# Patient Record
Sex: FEMALE | Race: BLACK OR AFRICAN AMERICAN | Employment: UNEMPLOYED | URBAN - METROPOLITAN AREA
[De-identification: names, ages, dates, MRNs, and addresses within clinical notes are randomized per-mention and may not be internally consistent; named-entity substitution may affect disease eponyms.]

---
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
## 2022-01-05 ENCOUNTER — HOSPITAL ENCOUNTER (INPATIENT)
Age: 22
LOS: 4 days | Discharge: HOME | DRG: 540 | End: 2022-01-09
Attending: OBSTETRICS & GYNECOLOGY | Admitting: OBSTETRICS & GYNECOLOGY
Payer: COMMERCIAL

## 2022-01-05 DIAGNOSIS — G89.18 POST-OP PAIN: Primary | ICD-10-CM

## 2022-01-05 PROBLEM — Z34.90 ENCOUNTER FOR INDUCTION OF LABOR: Status: ACTIVE | Noted: 2022-01-05

## 2022-01-05 LAB
ABO/RH: NORMAL
AMPHETAMINES: NEGATIVE
ANTIBODY SCREEN: NEGATIVE
BARBITURATE SCREEN URINE: NEGATIVE
BENZODIAZEPINE SCREEN, URINE: NEGATIVE
CANNABINOID SCREEN URINE: NEGATIVE
COCAINE METABOLITE: NEGATIVE
HCT VFR BLD CALC: 36.8 % (ref 37–47)
HEMOGLOBIN: 11.9 GM/DL (ref 12.5–16)
MCH RBC QN AUTO: 27.9 PG (ref 27–31)
MCHC RBC AUTO-ENTMCNC: 32.3 % (ref 32–36)
MCV RBC AUTO: 86.2 FL (ref 78–100)
OPIATES, URINE: NEGATIVE
OXYCODONE: NEGATIVE
PDW BLD-RTO: 12.2 % (ref 11.7–14.9)
PHENCYCLIDINE, URINE: NEGATIVE
PLATELET # BLD: 200 K/CU MM (ref 140–440)
PMV BLD AUTO: 10.5 FL (ref 7.5–11.1)
RBC # BLD: 4.27 M/CU MM (ref 4.2–5.4)
SARS-COV-2, NAAT: NOT DETECTED
WBC # BLD: 12.4 K/CU MM (ref 4–10.5)

## 2022-01-05 PROCEDURE — 80307 DRUG TEST PRSMV CHEM ANLYZR: CPT

## 2022-01-05 PROCEDURE — 2580000003 HC RX 258: Performed by: OBSTETRICS & GYNECOLOGY

## 2022-01-05 PROCEDURE — 86850 RBC ANTIBODY SCREEN: CPT

## 2022-01-05 PROCEDURE — 6360000002 HC RX W HCPCS: Performed by: OBSTETRICS & GYNECOLOGY

## 2022-01-05 PROCEDURE — 86901 BLOOD TYPING SEROLOGIC RH(D): CPT

## 2022-01-05 PROCEDURE — 6370000000 HC RX 637 (ALT 250 FOR IP): Performed by: OBSTETRICS & GYNECOLOGY

## 2022-01-05 PROCEDURE — 85027 COMPLETE CBC AUTOMATED: CPT

## 2022-01-05 PROCEDURE — 86900 BLOOD TYPING SEROLOGIC ABO: CPT

## 2022-01-05 PROCEDURE — 1220000000 HC SEMI PRIVATE OB R&B

## 2022-01-05 PROCEDURE — 87635 SARS-COV-2 COVID-19 AMP PRB: CPT

## 2022-01-05 RX ORDER — LIDOCAINE HYDROCHLORIDE 10 MG/ML
30 INJECTION, SOLUTION EPIDURAL; INFILTRATION; INTRACAUDAL; PERINEURAL PRN
Status: DISCONTINUED | OUTPATIENT
Start: 2022-01-05 | End: 2022-01-06 | Stop reason: ALTCHOICE

## 2022-01-05 RX ORDER — FENTANYL CITRATE 50 UG/ML
100 INJECTION, SOLUTION INTRAMUSCULAR; INTRAVENOUS
Status: DISCONTINUED | OUTPATIENT
Start: 2022-01-05 | End: 2022-01-06 | Stop reason: HOSPADM

## 2022-01-05 RX ORDER — ONDANSETRON 2 MG/ML
4 INJECTION INTRAMUSCULAR; INTRAVENOUS EVERY 6 HOURS PRN
Status: DISCONTINUED | OUTPATIENT
Start: 2022-01-05 | End: 2022-01-06 | Stop reason: HOSPADM

## 2022-01-05 RX ORDER — SODIUM CHLORIDE 0.9 % (FLUSH) 0.9 %
5-40 SYRINGE (ML) INJECTION PRN
Status: DISCONTINUED | OUTPATIENT
Start: 2022-01-05 | End: 2022-01-06 | Stop reason: ALTCHOICE

## 2022-01-05 RX ORDER — SODIUM CHLORIDE, SODIUM LACTATE, POTASSIUM CHLORIDE, AND CALCIUM CHLORIDE .6; .31; .03; .02 G/100ML; G/100ML; G/100ML; G/100ML
1000 INJECTION, SOLUTION INTRAVENOUS PRN
Status: DISCONTINUED | OUTPATIENT
Start: 2022-01-05 | End: 2022-01-06 | Stop reason: ALTCHOICE

## 2022-01-05 RX ORDER — SODIUM CHLORIDE 0.9 % (FLUSH) 0.9 %
5-40 SYRINGE (ML) INJECTION EVERY 12 HOURS SCHEDULED
Status: DISCONTINUED | OUTPATIENT
Start: 2022-01-05 | End: 2022-01-06 | Stop reason: ALTCHOICE

## 2022-01-05 RX ORDER — SODIUM CHLORIDE 9 MG/ML
25 INJECTION, SOLUTION INTRAVENOUS PRN
Status: DISCONTINUED | OUTPATIENT
Start: 2022-01-05 | End: 2022-01-09 | Stop reason: HOSPADM

## 2022-01-05 RX ORDER — SODIUM CHLORIDE, SODIUM LACTATE, POTASSIUM CHLORIDE, CALCIUM CHLORIDE 600; 310; 30; 20 MG/100ML; MG/100ML; MG/100ML; MG/100ML
INJECTION, SOLUTION INTRAVENOUS CONTINUOUS
Status: DISCONTINUED | OUTPATIENT
Start: 2022-01-05 | End: 2022-01-06 | Stop reason: ALTCHOICE

## 2022-01-05 RX ORDER — ACYCLOVIR 800 MG/1
400 TABLET ORAL 3 TIMES DAILY
Status: DISCONTINUED | OUTPATIENT
Start: 2022-01-05 | End: 2022-01-06

## 2022-01-05 RX ORDER — SODIUM CHLORIDE, SODIUM LACTATE, POTASSIUM CHLORIDE, AND CALCIUM CHLORIDE .6; .31; .03; .02 G/100ML; G/100ML; G/100ML; G/100ML
500 INJECTION, SOLUTION INTRAVENOUS PRN
Status: DISCONTINUED | OUTPATIENT
Start: 2022-01-05 | End: 2022-01-06 | Stop reason: ALTCHOICE

## 2022-01-05 RX ADMIN — Medication 25 MCG: at 21:43

## 2022-01-05 RX ADMIN — AMPICILLIN SODIUM 2000 MG: 2 INJECTION, POWDER, FOR SOLUTION INTRAMUSCULAR; INTRAVENOUS at 21:45

## 2022-01-05 RX ADMIN — SODIUM CHLORIDE, POTASSIUM CHLORIDE, SODIUM LACTATE AND CALCIUM CHLORIDE: 600; 310; 30; 20 INJECTION, SOLUTION INTRAVENOUS at 21:05

## 2022-01-05 ASSESSMENT — PAIN SCALES - GENERAL: PAINLEVEL_OUTOF10: 0

## 2022-01-06 ENCOUNTER — ANESTHESIA (OUTPATIENT)
Dept: LABOR AND DELIVERY | Age: 22
DRG: 540 | End: 2022-01-06
Payer: COMMERCIAL

## 2022-01-06 ENCOUNTER — ANESTHESIA EVENT (OUTPATIENT)
Dept: LABOR AND DELIVERY | Age: 22
DRG: 540 | End: 2022-01-06
Payer: COMMERCIAL

## 2022-01-06 VITALS — DIASTOLIC BLOOD PRESSURE: 71 MMHG | OXYGEN SATURATION: 100 % | SYSTOLIC BLOOD PRESSURE: 109 MMHG

## 2022-01-06 LAB
HCT VFR BLD CALC: 31.3 % (ref 37–47)
HEMOGLOBIN: 10.1 GM/DL (ref 12.5–16)
HEPATITIS B SURFACE ANTIGEN: NON REACTIVE
MCH RBC QN AUTO: 27.9 PG (ref 27–31)
MCHC RBC AUTO-ENTMCNC: 32.3 % (ref 32–36)
MCV RBC AUTO: 86.5 FL (ref 78–100)
PDW BLD-RTO: 12.3 % (ref 11.7–14.9)
PLATELET # BLD: 164 K/CU MM (ref 140–440)
PMV BLD AUTO: 9.9 FL (ref 7.5–11.1)
RBC # BLD: 3.62 M/CU MM (ref 4.2–5.4)
WBC # BLD: 13.6 K/CU MM (ref 4–10.5)

## 2022-01-06 PROCEDURE — 7100000000 HC PACU RECOVERY - FIRST 15 MIN: Performed by: OBSTETRICS & GYNECOLOGY

## 2022-01-06 PROCEDURE — 6360000002 HC RX W HCPCS: Performed by: OBSTETRICS & GYNECOLOGY

## 2022-01-06 PROCEDURE — 3E0DXGC INTRODUCTION OF OTHER THERAPEUTIC SUBSTANCE INTO MOUTH AND PHARYNX, EXTERNAL APPROACH: ICD-10-PCS | Performed by: OBSTETRICS & GYNECOLOGY

## 2022-01-06 PROCEDURE — 6360000002 HC RX W HCPCS: Performed by: NURSE ANESTHETIST, CERTIFIED REGISTERED

## 2022-01-06 PROCEDURE — 2500000003 HC RX 250 WO HCPCS: Performed by: NURSE ANESTHETIST, CERTIFIED REGISTERED

## 2022-01-06 PROCEDURE — 10907ZC DRAINAGE OF AMNIOTIC FLUID, THERAPEUTIC FROM PRODUCTS OF CONCEPTION, VIA NATURAL OR ARTIFICIAL OPENING: ICD-10-PCS | Performed by: OBSTETRICS & GYNECOLOGY

## 2022-01-06 PROCEDURE — 87340 HEPATITIS B SURFACE AG IA: CPT

## 2022-01-06 PROCEDURE — 2580000003 HC RX 258: Performed by: OBSTETRICS & GYNECOLOGY

## 2022-01-06 PROCEDURE — 51702 INSERT TEMP BLADDER CATH: CPT

## 2022-01-06 PROCEDURE — 7100000001 HC PACU RECOVERY - ADDTL 15 MIN: Performed by: OBSTETRICS & GYNECOLOGY

## 2022-01-06 PROCEDURE — 2580000003 HC RX 258: Performed by: NURSE ANESTHETIST, CERTIFIED REGISTERED

## 2022-01-06 PROCEDURE — 2500000003 HC RX 250 WO HCPCS: Performed by: OBSTETRICS & GYNECOLOGY

## 2022-01-06 PROCEDURE — 86762 RUBELLA ANTIBODY: CPT

## 2022-01-06 PROCEDURE — 6370000000 HC RX 637 (ALT 250 FOR IP): Performed by: OBSTETRICS & GYNECOLOGY

## 2022-01-06 PROCEDURE — 3609079900 HC CESAREAN SECTION: Performed by: OBSTETRICS & GYNECOLOGY

## 2022-01-06 PROCEDURE — 1220000000 HC SEMI PRIVATE OB R&B

## 2022-01-06 PROCEDURE — 87536 HIV-1 QUANT&REVRSE TRNSCRPJ: CPT

## 2022-01-06 PROCEDURE — 86900 BLOOD TYPING SEROLOGIC ABO: CPT

## 2022-01-06 PROCEDURE — 85027 COMPLETE CBC AUTOMATED: CPT

## 2022-01-06 PROCEDURE — 3700000025 EPIDURAL BLOCK: Performed by: NURSE ANESTHETIST, CERTIFIED REGISTERED

## 2022-01-06 RX ORDER — ONDANSETRON 2 MG/ML
4 INJECTION INTRAMUSCULAR; INTRAVENOUS EVERY 6 HOURS PRN
Status: DISCONTINUED | OUTPATIENT
Start: 2022-01-06 | End: 2022-01-06 | Stop reason: HOSPADM

## 2022-01-06 RX ORDER — ONDANSETRON 4 MG/1
8 TABLET, ORALLY DISINTEGRATING ORAL EVERY 8 HOURS PRN
Status: DISCONTINUED | OUTPATIENT
Start: 2022-01-06 | End: 2022-01-09 | Stop reason: HOSPADM

## 2022-01-06 RX ORDER — EPHEDRINE SULFATE 50 MG/ML
INJECTION, SOLUTION INTRAVENOUS PRN
Status: DISCONTINUED | OUTPATIENT
Start: 2022-01-06 | End: 2022-01-06 | Stop reason: SDUPTHER

## 2022-01-06 RX ORDER — SODIUM CHLORIDE 0.9 % (FLUSH) 0.9 %
10 SYRINGE (ML) INJECTION PRN
Status: DISCONTINUED | OUTPATIENT
Start: 2022-01-06 | End: 2022-01-06 | Stop reason: ALTCHOICE

## 2022-01-06 RX ORDER — IBUPROFEN 800 MG/1
800 TABLET ORAL EVERY 8 HOURS SCHEDULED
Status: DISCONTINUED | OUTPATIENT
Start: 2022-01-07 | End: 2022-01-07

## 2022-01-06 RX ORDER — KETOROLAC TROMETHAMINE 30 MG/ML
30 INJECTION, SOLUTION INTRAMUSCULAR; INTRAVENOUS EVERY 6 HOURS
Status: DISCONTINUED | OUTPATIENT
Start: 2022-01-06 | End: 2022-01-06 | Stop reason: SDUPTHER

## 2022-01-06 RX ORDER — KETOROLAC TROMETHAMINE 30 MG/ML
30 INJECTION, SOLUTION INTRAMUSCULAR; INTRAVENOUS EVERY 6 HOURS
Status: DISCONTINUED | OUTPATIENT
Start: 2022-01-06 | End: 2022-01-07

## 2022-01-06 RX ORDER — PROPOFOL 10 MG/ML
INJECTION, EMULSION INTRAVENOUS PRN
Status: DISCONTINUED | OUTPATIENT
Start: 2022-01-06 | End: 2022-01-06 | Stop reason: SDUPTHER

## 2022-01-06 RX ORDER — ACYCLOVIR 200 MG/1
400 CAPSULE ORAL 3 TIMES DAILY
Status: DISCONTINUED | OUTPATIENT
Start: 2022-01-06 | End: 2022-01-09 | Stop reason: HOSPADM

## 2022-01-06 RX ORDER — DIPHENHYDRAMINE HYDROCHLORIDE 50 MG/ML
12.5 INJECTION INTRAMUSCULAR; INTRAVENOUS EVERY 4 HOURS PRN
Status: DISCONTINUED | OUTPATIENT
Start: 2022-01-06 | End: 2022-01-09 | Stop reason: HOSPADM

## 2022-01-06 RX ORDER — SODIUM CHLORIDE 0.9 % (FLUSH) 0.9 %
10 SYRINGE (ML) INJECTION EVERY 12 HOURS SCHEDULED
Status: DISCONTINUED | OUTPATIENT
Start: 2022-01-06 | End: 2022-01-09 | Stop reason: HOSPADM

## 2022-01-06 RX ORDER — ACETAMINOPHEN 325 MG/1
650 TABLET ORAL EVERY 4 HOURS PRN
Status: DISCONTINUED | OUTPATIENT
Start: 2022-01-06 | End: 2022-01-09 | Stop reason: HOSPADM

## 2022-01-06 RX ORDER — SODIUM CHLORIDE 9 MG/ML
25 INJECTION, SOLUTION INTRAVENOUS PRN
Status: DISCONTINUED | OUTPATIENT
Start: 2022-01-06 | End: 2022-01-09 | Stop reason: HOSPADM

## 2022-01-06 RX ORDER — MORPHINE SULFATE 0.5 MG/ML
INJECTION, SOLUTION EPIDURAL; INTRATHECAL; INTRAVENOUS PRN
Status: DISCONTINUED | OUTPATIENT
Start: 2022-01-06 | End: 2022-01-06 | Stop reason: SDUPTHER

## 2022-01-06 RX ORDER — SODIUM CHLORIDE, SODIUM LACTATE, POTASSIUM CHLORIDE, CALCIUM CHLORIDE 600; 310; 30; 20 MG/100ML; MG/100ML; MG/100ML; MG/100ML
INJECTION, SOLUTION INTRAVENOUS CONTINUOUS PRN
Status: DISCONTINUED | OUTPATIENT
Start: 2022-01-06 | End: 2022-01-06 | Stop reason: SDUPTHER

## 2022-01-06 RX ORDER — SODIUM CHLORIDE 0.9 % (FLUSH) 0.9 %
10 SYRINGE (ML) INJECTION PRN
Status: DISCONTINUED | OUTPATIENT
Start: 2022-01-06 | End: 2022-01-09 | Stop reason: HOSPADM

## 2022-01-06 RX ORDER — METHYLERGONOVINE MALEATE 0.2 MG/ML
200 INJECTION INTRAVENOUS PRN
Status: DISCONTINUED | OUTPATIENT
Start: 2022-01-06 | End: 2022-01-09 | Stop reason: HOSPADM

## 2022-01-06 RX ORDER — LIDOCAINE HYDROCHLORIDE AND EPINEPHRINE 20; 5 MG/ML; UG/ML
INJECTION, SOLUTION EPIDURAL; INFILTRATION; INTRACAUDAL; PERINEURAL PRN
Status: DISCONTINUED | OUTPATIENT
Start: 2022-01-06 | End: 2022-01-06 | Stop reason: SDUPTHER

## 2022-01-06 RX ORDER — ROPIVACAINE HYDROCHLORIDE 2 MG/ML
12 INJECTION, SOLUTION EPIDURAL; INFILTRATION; PERINEURAL CONTINUOUS
Status: DISCONTINUED | OUTPATIENT
Start: 2022-01-06 | End: 2022-01-06 | Stop reason: ALTCHOICE

## 2022-01-06 RX ORDER — EPHEDRINE SULFATE 50 MG/ML
INJECTION INTRAVENOUS PRN
Status: DISCONTINUED | OUTPATIENT
Start: 2022-01-06 | End: 2022-01-06 | Stop reason: SDUPTHER

## 2022-01-06 RX ORDER — NALOXONE HYDROCHLORIDE 0.4 MG/ML
0.4 INJECTION, SOLUTION INTRAMUSCULAR; INTRAVENOUS; SUBCUTANEOUS PRN
Status: DISCONTINUED | OUTPATIENT
Start: 2022-01-06 | End: 2022-01-09 | Stop reason: HOSPADM

## 2022-01-06 RX ORDER — PROMETHAZINE HYDROCHLORIDE 25 MG/ML
12.5 INJECTION, SOLUTION INTRAMUSCULAR; INTRAVENOUS ONCE
Status: COMPLETED | OUTPATIENT
Start: 2022-01-06 | End: 2022-01-06

## 2022-01-06 RX ORDER — ROPIVACAINE HYDROCHLORIDE 2 MG/ML
INJECTION, SOLUTION EPIDURAL; INFILTRATION; PERINEURAL PRN
Status: DISCONTINUED | OUTPATIENT
Start: 2022-01-06 | End: 2022-01-06 | Stop reason: SDUPTHER

## 2022-01-06 RX ORDER — PHENYLEPHRINE HCL IN 0.9% NACL 1 MG/10 ML
SYRINGE (ML) INTRAVENOUS PRN
Status: DISCONTINUED | OUTPATIENT
Start: 2022-01-06 | End: 2022-01-06 | Stop reason: SDUPTHER

## 2022-01-06 RX ORDER — FERROUS SULFATE 325(65) MG
325 TABLET ORAL 2 TIMES DAILY WITH MEALS
Status: DISCONTINUED | OUTPATIENT
Start: 2022-01-07 | End: 2022-01-09 | Stop reason: HOSPADM

## 2022-01-06 RX ORDER — TRISODIUM CITRATE DIHYDRATE AND CITRIC ACID MONOHYDRATE 500; 334 MG/5ML; MG/5ML
30 SOLUTION ORAL ONCE
Status: COMPLETED | OUTPATIENT
Start: 2022-01-06 | End: 2022-01-06

## 2022-01-06 RX ORDER — CEFAZOLIN SODIUM 2 G/100ML
2000 INJECTION, SOLUTION INTRAVENOUS ONCE
Status: COMPLETED | OUTPATIENT
Start: 2022-01-06 | End: 2022-01-06

## 2022-01-06 RX ORDER — SODIUM CHLORIDE, SODIUM LACTATE, POTASSIUM CHLORIDE, CALCIUM CHLORIDE 600; 310; 30; 20 MG/100ML; MG/100ML; MG/100ML; MG/100ML
INJECTION, SOLUTION INTRAVENOUS CONTINUOUS
Status: DISCONTINUED | OUTPATIENT
Start: 2022-01-06 | End: 2022-01-09 | Stop reason: HOSPADM

## 2022-01-06 RX ORDER — CEFAZOLIN SODIUM 1 G/3ML
INJECTION, POWDER, FOR SOLUTION INTRAMUSCULAR; INTRAVENOUS PRN
Status: DISCONTINUED | OUTPATIENT
Start: 2022-01-06 | End: 2022-01-06 | Stop reason: SDUPTHER

## 2022-01-06 RX ORDER — NALBUPHINE HCL 10 MG/ML
5 AMPUL (ML) INJECTION EVERY 4 HOURS PRN
Status: DISCONTINUED | OUTPATIENT
Start: 2022-01-06 | End: 2022-01-09 | Stop reason: HOSPADM

## 2022-01-06 RX ORDER — BISACODYL 10 MG
10 SUPPOSITORY, RECTAL RECTAL DAILY PRN
Status: DISCONTINUED | OUTPATIENT
Start: 2022-01-06 | End: 2022-01-09 | Stop reason: HOSPADM

## 2022-01-06 RX ORDER — OXYCODONE HYDROCHLORIDE AND ACETAMINOPHEN 5; 325 MG/1; MG/1
2 TABLET ORAL EVERY 4 HOURS PRN
Status: DISCONTINUED | OUTPATIENT
Start: 2022-01-06 | End: 2022-01-09 | Stop reason: HOSPADM

## 2022-01-06 RX ORDER — DIPHENHYDRAMINE HYDROCHLORIDE 50 MG/ML
25 INJECTION INTRAMUSCULAR; INTRAVENOUS EVERY 6 HOURS PRN
Status: DISCONTINUED | OUTPATIENT
Start: 2022-01-06 | End: 2022-01-09 | Stop reason: HOSPADM

## 2022-01-06 RX ORDER — LANOLIN 100 %
OINTMENT (GRAM) TOPICAL
Status: DISCONTINUED | OUTPATIENT
Start: 2022-01-06 | End: 2022-01-09 | Stop reason: HOSPADM

## 2022-01-06 RX ORDER — SODIUM CHLORIDE, SODIUM LACTATE, POTASSIUM CHLORIDE, AND CALCIUM CHLORIDE .6; .31; .03; .02 G/100ML; G/100ML; G/100ML; G/100ML
1000 INJECTION, SOLUTION INTRAVENOUS ONCE
Status: DISCONTINUED | OUTPATIENT
Start: 2022-01-06 | End: 2022-01-09 | Stop reason: HOSPADM

## 2022-01-06 RX ORDER — SIMETHICONE 80 MG
80 TABLET,CHEWABLE ORAL EVERY 6 HOURS PRN
Status: DISCONTINUED | OUTPATIENT
Start: 2022-01-06 | End: 2022-01-09 | Stop reason: HOSPADM

## 2022-01-06 RX ORDER — DOCUSATE SODIUM 100 MG/1
100 CAPSULE, LIQUID FILLED ORAL 2 TIMES DAILY
Status: DISCONTINUED | OUTPATIENT
Start: 2022-01-06 | End: 2022-01-09 | Stop reason: HOSPADM

## 2022-01-06 RX ORDER — OXYCODONE HYDROCHLORIDE AND ACETAMINOPHEN 5; 325 MG/1; MG/1
1 TABLET ORAL EVERY 4 HOURS PRN
Status: DISCONTINUED | OUTPATIENT
Start: 2022-01-06 | End: 2022-01-09 | Stop reason: HOSPADM

## 2022-01-06 RX ADMIN — AMPICILLIN SODIUM 1000 MG: 1 INJECTION, POWDER, FOR SOLUTION INTRAMUSCULAR; INTRAVENOUS at 15:10

## 2022-01-06 RX ADMIN — AMPICILLIN SODIUM 1000 MG: 1 INJECTION, POWDER, FOR SOLUTION INTRAMUSCULAR; INTRAVENOUS at 01:42

## 2022-01-06 RX ADMIN — EPHEDRINE SULFATE 25 MG: 50 INJECTION INTRAVENOUS at 11:08

## 2022-01-06 RX ADMIN — Medication 250 ML: at 17:21

## 2022-01-06 RX ADMIN — PROPOFOL 30 MG: 10 INJECTION, EMULSION INTRAVENOUS at 17:25

## 2022-01-06 RX ADMIN — SODIUM CHLORIDE, POTASSIUM CHLORIDE, SODIUM LACTATE AND CALCIUM CHLORIDE: 600; 310; 30; 20 INJECTION, SOLUTION INTRAVENOUS at 15:49

## 2022-01-06 RX ADMIN — Medication 1 MILLI-UNITS/MIN: at 14:06

## 2022-01-06 RX ADMIN — EPHEDRINE SULFATE 10 MG: 50 INJECTION, SOLUTION INTRAMUSCULAR; INTRAVENOUS; SUBCUTANEOUS at 10:46

## 2022-01-06 RX ADMIN — CEFAZOLIN SODIUM 2000 MG: 2 INJECTION, SOLUTION INTRAVENOUS at 16:57

## 2022-01-06 RX ADMIN — LIDOCAINE HYDROCHLORIDE AND EPINEPHRINE 8 ML: 20; 5 INJECTION, SOLUTION EPIDURAL; INFILTRATION; INTRACAUDAL; PERINEURAL at 16:48

## 2022-01-06 RX ADMIN — KETOROLAC TROMETHAMINE 30 MG: 30 INJECTION, SOLUTION INTRAMUSCULAR at 22:22

## 2022-01-06 RX ADMIN — FAMOTIDINE 20 MG: 10 INJECTION, SOLUTION INTRAVENOUS at 16:54

## 2022-01-06 RX ADMIN — DIPHENHYDRAMINE HYDROCHLORIDE 12.5 MG: 50 INJECTION, SOLUTION INTRAMUSCULAR; INTRAVENOUS at 23:53

## 2022-01-06 RX ADMIN — ACYCLOVIR 400 MG: 200 CAPSULE ORAL at 14:06

## 2022-01-06 RX ADMIN — LIDOCAINE HYDROCHLORIDE AND EPINEPHRINE 5 ML: 20; 5 INJECTION, SOLUTION EPIDURAL; INFILTRATION; INTRACAUDAL; PERINEURAL at 16:55

## 2022-01-06 RX ADMIN — SODIUM CHLORIDE, POTASSIUM CHLORIDE, SODIUM LACTATE AND CALCIUM CHLORIDE: 600; 310; 30; 20 INJECTION, SOLUTION INTRAVENOUS at 17:07

## 2022-01-06 RX ADMIN — ACYCLOVIR 400 MG: 200 CAPSULE ORAL at 09:23

## 2022-01-06 RX ADMIN — SODIUM CHLORIDE, POTASSIUM CHLORIDE, SODIUM LACTATE AND CALCIUM CHLORIDE 950 ML: 600; 310; 30; 20 INJECTION, SOLUTION INTRAVENOUS at 02:30

## 2022-01-06 RX ADMIN — ROPIVACAINE HYDROCHLORIDE 5 MG: 2 INJECTION, SOLUTION EPIDURAL; INFILTRATION at 09:39

## 2022-01-06 RX ADMIN — ACYCLOVIR 400 MG: 200 CAPSULE ORAL at 22:22

## 2022-01-06 RX ADMIN — MORPHINE SULFATE 2.5 MG: 0.5 INJECTION, SOLUTION EPIDURAL; INTRATHECAL; INTRAVENOUS at 17:21

## 2022-01-06 RX ADMIN — AMPICILLIN SODIUM 1000 MG: 1 INJECTION, POWDER, FOR SOLUTION INTRAMUSCULAR; INTRAVENOUS at 06:21

## 2022-01-06 RX ADMIN — FENTANYL CITRATE 100 MCG: 50 INJECTION INTRAMUSCULAR; INTRAVENOUS at 05:48

## 2022-01-06 RX ADMIN — Medication 100 MCG: at 17:25

## 2022-01-06 RX ADMIN — AMPICILLIN SODIUM 1000 MG: 1 INJECTION, POWDER, FOR SOLUTION INTRAMUSCULAR; INTRAVENOUS at 10:47

## 2022-01-06 RX ADMIN — SODIUM CHLORIDE, POTASSIUM CHLORIDE, SODIUM LACTATE AND CALCIUM CHLORIDE 1000 ML: 600; 310; 30; 20 INJECTION, SOLUTION INTRAVENOUS at 10:21

## 2022-01-06 RX ADMIN — CEFAZOLIN 2000 MG: 1 INJECTION, POWDER, FOR SOLUTION INTRAMUSCULAR; INTRAVENOUS; PARENTERAL at 17:03

## 2022-01-06 RX ADMIN — MORPHINE SULFATE 2.5 MG: 0.5 INJECTION, SOLUTION EPIDURAL; INTRATHECAL; INTRAVENOUS at 17:25

## 2022-01-06 RX ADMIN — PROMETHAZINE HYDROCHLORIDE 12.5 MG: 25 INJECTION INTRAMUSCULAR; INTRAVENOUS at 09:23

## 2022-01-06 RX ADMIN — SODIUM CHLORIDE, POTASSIUM CHLORIDE, SODIUM LACTATE AND CALCIUM CHLORIDE 1000 ML: 600; 310; 30; 20 INJECTION, SOLUTION INTRAVENOUS at 09:07

## 2022-01-06 RX ADMIN — SODIUM CHLORIDE, POTASSIUM CHLORIDE, SODIUM LACTATE AND CALCIUM CHLORIDE: 600; 310; 30; 20 INJECTION, SOLUTION INTRAVENOUS at 22:21

## 2022-01-06 RX ADMIN — SODIUM CITRATE AND CITRIC ACID MONOHYDRATE 30 ML: 500; 334 SOLUTION ORAL at 16:54

## 2022-01-06 RX ADMIN — Medication 25 MCG: at 02:20

## 2022-01-06 RX ADMIN — PROPOFOL 50 MG: 10 INJECTION, EMULSION INTRAVENOUS at 17:23

## 2022-01-06 RX ADMIN — ONDANSETRON 4 MG: 2 INJECTION INTRAMUSCULAR; INTRAVENOUS at 05:17

## 2022-01-06 RX ADMIN — ROPIVACAINE HYDROCHLORIDE 5 MG: 2 INJECTION, SOLUTION EPIDURAL; INFILTRATION at 09:36

## 2022-01-06 ASSESSMENT — PAIN DESCRIPTION - PROGRESSION
CLINICAL_PROGRESSION: GRADUALLY WORSENING
CLINICAL_PROGRESSION: GRADUALLY IMPROVING
CLINICAL_PROGRESSION: NOT CHANGED
CLINICAL_PROGRESSION: NOT CHANGED

## 2022-01-06 ASSESSMENT — PAIN DESCRIPTION - DESCRIPTORS
DESCRIPTORS: DISCOMFORT;SORE
DESCRIPTORS: DISCOMFORT;SORE
DESCRIPTORS: ACHING

## 2022-01-06 ASSESSMENT — PULMONARY FUNCTION TESTS
PIF_VALUE: 1
PIF_VALUE: 0

## 2022-01-06 ASSESSMENT — PAIN SCALES - GENERAL
PAINLEVEL_OUTOF10: 0
PAINLEVEL_OUTOF10: 5
PAINLEVEL_OUTOF10: 0
PAINLEVEL_OUTOF10: 0
PAINLEVEL_OUTOF10: 3
PAINLEVEL_OUTOF10: 0
PAINLEVEL_OUTOF10: 3
PAINLEVEL_OUTOF10: 8
PAINLEVEL_OUTOF10: 0
PAINLEVEL_OUTOF10: 0

## 2022-01-06 ASSESSMENT — PAIN DESCRIPTION - FREQUENCY
FREQUENCY: CONTINUOUS

## 2022-01-06 ASSESSMENT — PAIN DESCRIPTION - PAIN TYPE
TYPE: ACUTE PAIN
TYPE: ACUTE PAIN;SURGICAL PAIN
TYPE: ACUTE PAIN;SURGICAL PAIN

## 2022-01-06 ASSESSMENT — PAIN DESCRIPTION - ONSET
ONSET: ON-GOING

## 2022-01-06 ASSESSMENT — PAIN DESCRIPTION - LOCATION
LOCATION: ABDOMEN
LOCATION: SHOULDER
LOCATION: ABDOMEN

## 2022-01-06 ASSESSMENT — PAIN DESCRIPTION - ORIENTATION
ORIENTATION: RIGHT;LEFT
ORIENTATION: MID;LOWER
ORIENTATION: MID;LOWER

## 2022-01-06 ASSESSMENT — PAIN - FUNCTIONAL ASSESSMENT
PAIN_FUNCTIONAL_ASSESSMENT: ACTIVITIES ARE NOT PREVENTED

## 2022-01-06 NOTE — ANESTHESIA PRE PROCEDURE
Department of Anesthesiology  Preprocedure Note       Name:  Avtar Arnold   Age:  25 y.o.  :  2000                                          MRN:  [id number]         Date:  2022      Surgeon: * No surgeons listed *    Procedure: * No procedures listed *    Medications prior to admission:   Prior to Admission medications    Medication Sig Start Date End Date Taking?  Authorizing Provider   acyclovir (ZOVIRAX) 400 MG tablet Take 400 mg by mouth 3 times daily    Yes Historical Provider, MD   Prenatal Vit-Fe Fumarate-FA (PRENATAL COMPLETE) 14-0.4 MG TABS Take 1 tablet by mouth daily 20  Yes Lilly Deleon PA-C   aspirin 81 MG EC tablet Take 81 mg by mouth daily    Historical Provider, MD   metoclopramide (REGLAN) 10 MG tablet Take 1 tablet by mouth 4 times daily for 10 days 20  Lilly Deleon PA-C       Current medications:    Current Facility-Administered Medications   Medication Dose Route Frequency Provider Last Rate Last Admin    acyclovir (ZOVIRAX) capsule 400 mg  400 mg Oral TID Lorri Nguyen MD   400 mg at 22 4930    lactated ringers infusion   IntraVENous Continuous Lorri Nguyen  mL/hr at 22 0741 Rate Change at 22 0741    lactated ringers bolus  500 mL IntraVENous PRN Lorri Nguyen MD        Or    lactated ringers bolus  1,000 mL IntraVENous PRN Lorri Nguyen  mL/hr at 22 0907 1,000 mL at 22 8730    sodium chloride flush 0.9 % injection 5-40 mL  5-40 mL IntraVENous 2 times per day Lorri Nguyen MD        sodium chloride flush 0.9 % injection 5-40 mL  5-40 mL IntraVENous PRN Lorri Nguyen MD        0.9 % sodium chloride infusion  25 mL IntraVENous PRN Lorri Nguyen MD        lidocaine PF 1 % injection 30 mL  30 mL Other PRN Lorri Nguyen MD        fentaNYL (SUBLIMAZE) injection 100 mcg  100 mcg IntraVENous Q1H PRN Cipriano Parada Severo Brisker, MD   100 mcg at 01/06/22 0548    oxytocin (PITOCIN) 30 units in 500 mL infusion  1 kristina-units/min IntraVENous Continuous Eric Norris MD   Held at 01/05/22 2212    ondansetron [facility]) injection 4 mg  4 mg IntraVENous Q6H PRN Eric Norris MD   4 mg at 01/06/22 0517    famotidine (PEPCID) injection 20 mg  20 mg IntraVENous BID PRN Eric Norris MD        oxytocin (PITOCIN) 30 units in 500 mL infusion  87.3 kristina-units/min IntraVENous Continuous PRN Eric Norris MD        And    oxytocin (PITOCIN) 10 unit bolus from the bag  10 Units IntraVENous PRN Eric Norris MD        ampicillin 1000 mg ivpb mini bag  1,000 mg IntraVENous Q4H Eric Norris MD   Stopped at 01/06/22 235    miSOPROStol (CYTOTEC) pre-split tablet TABS 25 mcg  25 mcg Vaginal Q4H Eric Norris MD   25 mcg at 01/06/22 0220     Facility-Administered Medications Ordered in Other Encounters   Medication Dose Route Frequency Provider Last Rate Last Admin    ropivacaine (NAROPIN) 0.2% injection 0.2%   Epidural PRN Rockne Plate, APRN - CRNA   5 mg at 01/06/22 2811       Allergies:  No Known Allergies    Problem List:    Patient Active Problem List   Diagnosis Code    Pregnancy as incidental finding Z33.1    Labor and delivery, indication for care O75.9    Encounter for induction of labor Z34.90       Past Medical History:        Diagnosis Date    Herpes simplex virus (HSV) infection     Hypertension        Past Surgical History:  No past surgical history on file. Social History:    Social History     Tobacco Use    Smoking status: Never Smoker    Smokeless tobacco: Never Used   Substance Use Topics    Alcohol use:  No                                Counseling given: Not Answered      Vital Signs (Current):   Vitals:    01/06/22 0940 01/06/22 0941 01/06/22 0943 01/06/22 0944   BP: 118/73 118/73  (!) 127/59   Pulse: 71 71 69   Resp:       Temp:       TempSrc:       SpO2: 92% 92% 92%    Weight:       Height:                                                  BP Readings from Last 3 Encounters:   01/06/22 (!) 127/59   12/31/21 124/68   12/09/21 (!) 102/58       NPO Status:                                                                                 BMI:   Wt Readings from Last 3 Encounters:   01/05/22 177 lb (80.3 kg) (94 %, Z= 1.57)*   12/31/21 175 lb (79.4 kg) (94 %, Z= 1.53)*   12/09/21 164 lb (74.4 kg) (90 %, Z= 1.29)*     * Growth percentiles are based on CDC (Girls, 2-20 Years) data. Body mass index is 29.91 kg/m². CBC:   Lab Results   Component Value Date    WBC 12.4 01/05/2022    RBC 4.27 01/05/2022    HGB 11.9 01/05/2022    HCT 36.8 01/05/2022    MCV 86.2 01/05/2022    RDW 12.2 01/05/2022     01/05/2022       CMP:   Lab Results   Component Value Date     10/14/2021    K 3.8 10/14/2021     10/14/2021    CO2 20 10/14/2021    BUN 8 10/14/2021    CREATININE 0.5 10/14/2021    GFRAA >60 10/14/2021    LABGLOM >60 10/14/2021    GLUCOSE 115 10/14/2021    PROT 6.7 10/14/2021    CALCIUM 9.1 10/14/2021    BILITOT 0.2 10/14/2021    ALKPHOS 61 10/14/2021    AST 14 10/14/2021    ALT 7 10/14/2021       POC Tests: No results for input(s): POCGLU, POCNA, POCK, POCCL, POCBUN, POCHEMO, POCHCT in the last 72 hours.     Coags: No results found for: PROTIME, INR, APTT    HCG (If Applicable):   Lab Results   Component Value Date    PREGTESTUR Positive 03/12/2020        ABGs: No results found for: PHART, PO2ART, ATM3FKK, KUP0RUU, BEART, X2ZQBSVE     Type & Screen (If Applicable):  No results found for: LABABO, LABRH    Drug/Infectious Status (If Applicable):  No results found for: HIV, HEPCAB    COVID-19 Screening (If Applicable):   Lab Results   Component Value Date    COVID19 NOT DETECTED 01/05/2022    COVID19 NOT DETECTED 04/09/2021           Anesthesia Evaluation  Patient summary reviewed and Nursing notes reviewed no history of anesthetic complications:   Airway: Mallampati: II  TM distance: >3 FB   Neck ROM: full  Mouth opening: > = 3 FB Dental:          Pulmonary:Negative Pulmonary ROS and normal exam                               Cardiovascular:    (+) hypertension:,                   Neuro/Psych:   Negative Neuro/Psych ROS              GI/Hepatic/Renal: Neg GI/Hepatic/Renal ROS            Endo/Other: Negative Endo/Other ROS                    Abdominal:             Vascular: Other Findings:             Anesthesia Plan      epidural     ASA 2             Anesthetic plan and risks discussed with patient. Use of blood products discussed with patient whom. Plan discussed with CRNA.                   LYNN Kinney - CRNA   1/6/2022

## 2022-01-06 NOTE — FLOWSHEET NOTE
Dr. Holly Oakley telephoned, notified of patient's arrival at [facility] for scheduled cytotec induction, pt's ob history, GBS positive per history, VS and attempt for cervical exam, hx of genital herpes and recurrent active outbreaks during the beginning of pregnancy, pt takes acyclovir 400mg PO TID. pt reports no active outbreak recently and no lesions noted upon inspection during vaginal exam. See orders.

## 2022-01-06 NOTE — ANESTHESIA POSTPROCEDURE EVALUATION
Department of Anesthesiology  Postprocedure Note    Patient: Winston Rosado  MRN: [id number]  YOB: 2000  Date of evaluation: 1/6/2022  Time:  5:59 PM     Procedure Summary     Date: 01/06/22 Room / Location:     Anesthesia Start: 6995 Anesthesia Stop: 1759    Procedure: Labor Analgesia Diagnosis:     Scheduled Providers:  Responsible Provider: LYNN Michelle CRNA    Anesthesia Type: epidural ASA Status: 2          Anesthesia Type: epidural    Christiana Phase I:      Christiana Phase II:      Last vitals: Reviewed and per EMR flowsheets.        Anesthesia Post Evaluation    Patient location during evaluation: PACU  Patient participation: complete - patient participated  Level of consciousness: awake  Pain score: 0  Airway patency: patent  Nausea & Vomiting: no nausea and no vomiting  Complications: no  Cardiovascular status: blood pressure returned to baseline  Respiratory status: acceptable  Hydration status: euvolemic

## 2022-01-06 NOTE — ANESTHESIA PROCEDURE NOTES
Epidural Block    Patient location during procedure: OB  Start time: 1/6/2022 9:24 AM  End time: 1/6/2022 9:41 AM  Reason for block: labor epidural  Staffing  Performed: resident/CRNA   Resident/CRNA: LYNN Rodriguez CRNA  Preanesthetic Checklist  Completed: patient identified, IV checked, risks and benefits discussed, surgical consent, monitors and equipment checked, pre-op evaluation, timeout performed, anesthesia consent given, oxygen available and patient being monitored  Epidural  Patient position: sitting  Prep: ChloraPrep  Patient monitoring: continuous pulse ox and frequent blood pressure checks  Approach: midline  Location: lumbar (1-5)  Injection technique: PILLO saline  Provider prep: mask and sterile gloves  Needle  Needle type: Larissa   Needle gauge: 17 G  Needle length: 3.5 in  Needle insertion depth: 5 cm  Catheter type: side hole  Catheter size: 19 G  Catheter at skin depth: 10 cm  Test dose: negative  Assessment  Sensory level: T6  Hemodynamics: stable  Attempts: 1

## 2022-01-06 NOTE — FLOWSHEET NOTE
Spoke with Darryn Mejia CRNA after 500ml fluid bolus over 30 minutes completed. Updated on recent BP of 97/53, FHR stable, and mother sleeping. Okay to stop bolus and return fluids to continuous rate as ordered. Notify for a further decrease in BP, fetal HR decelerations, or mother becomes symptomatic.

## 2022-01-06 NOTE — H&P
Department of Obstetrics and Gynecology   Obstetrics History and Physical        CHIEF COMPLAINT:  Induction    HISTORY OF PRESENT ILLNESS:      The patient is a 25 y.o. female at 38w11d. OB History        2    Para        Term                AB   1    Living           SAB        IAB        Ectopic        Molar        Multiple        Live Births                Patient presents with a chief complaint as above and is being admitted for induction    Estimated Due Date: Estimated Date of Delivery: 22    PRENATAL CARE:    Complicated by: none    PAST OB HISTORY  OB History        2    Para        Term                AB   1    Living           SAB        IAB        Ectopic        Molar        Multiple        Live Births                    Past Medical History:        Diagnosis Date    Herpes simplex virus (HSV) infection     Hypertension      Past Surgical History:    No past surgical history on file. Allergies:  Patient has no known allergies.   Social History:    Social History     Socioeconomic History    Marital status: Single     Spouse name: Not on file    Number of children: Not on file    Years of education: Not on file    Highest education level: Not on file   Occupational History    Not on file   Tobacco Use    Smoking status: Never Smoker    Smokeless tobacco: Never Used   Vaping Use    Vaping Use: Never used   Substance and Sexual Activity    Alcohol use: No    Drug use: Not Currently     Types: Marijuana Olepaula Romo)     Comment: quit in 2021,when she was 3-4 months pregnant    Sexual activity: Yes     Partners: Male   Other Topics Concern    Not on file   Social History Narrative    Not on file     Social Determinants of Health     Financial Resource Strain:     Difficulty of Paying Living Expenses: Not on file   Food Insecurity:     Worried About Running Out of Food in the Last Year: Not on file    Ambreen of Food in the Last Year: Not on file Transportation Needs:     Lack of Transportation (Medical): Not on file    Lack of Transportation (Non-Medical): Not on file   Physical Activity:     Days of Exercise per Week: Not on file    Minutes of Exercise per Session: Not on file   Stress:     Feeling of Stress : Not on file   Social Connections:     Frequency of Communication with Friends and Family: Not on file    Frequency of Social Gatherings with Friends and Family: Not on file    Attends Gnosticist Services: Not on file    Active Member of [address] [place] [address] or Organizations: Not on file    Attends Club or Organization Meetings: Not on file    Marital Status: Not on file   Intimate Partner Violence:     Fear of Current or Ex-Partner: Not on file    Emotionally Abused: Not on file    Physically Abused: Not on file    Sexually Abused: Not on file   Housing Stability:     Unable to Pay for Housing in the Last Year: Not on file    Number of [place] in the Last Year: Not on file    Unstable Housing in the Last Year: Not on file     Family History:   No family history on file. Medications Prior to Admission:  Medications Prior to Admission: acyclovir (ZOVIRAX) 400 MG tablet, Take 400 mg by mouth 3 times daily   Prenatal Vit-Fe Fumarate-FA (PRENATAL COMPLETE) 14-0.4 MG TABS, Take 1 tablet by mouth daily  aspirin 81 MG EC tablet, Take 81 mg by mouth daily  metoclopramide (REGLAN) 10 MG tablet, Take 1 tablet by mouth 4 times daily for 10 days  [DISCONTINUED] pyridoxine (RA VITAMIN B-6) 50 MG tablet, Take 0.5 tablets by mouth 2 times daily as needed (nausea and vomiting)    REVIEW OF SYSTEMS:    CONSTITUTIONAL:  negative  RESPIRATORY:  negative  CARDIOVASCULAR:  negative  GASTROINTESTINAL:  negative  ALLERGIC/IMMUNOLOGIC:  negative  NEUROLOGICAL:  negative  BEHAVIOR/PSYCH:  negative    PHYSICAL EXAM:  Blood pressure (!) 115/58, pulse 74, temperature 98.2 °F (36.8 °C), temperature source Oral, resp.  rate 16, height 5' 4.5\" (1.638 m), weight 177 lb (80.3 kg), last menstrual period 04/08/2021, SpO2 100 %, unknown if currently breastfeeding. General appearance:  awake, alert, cooperative, no apparent distress, and appears stated age  Neurologic:  Awake, alert, oriented to name, place and time. Lungs:  No increased work of breathing, good air exchange  Abdomen:  Soft, non tender, gravid, consistent with her gestational age  Fetal heart rate:    Baseline Heart Rate:   Category 1 tracing     Pelvis:  Adequate pelvis  Cervix: 2 cm 75% medium -2    Contraction frequency:  2 minutes    Membranes:  Intact    ASSESSMENT AND PLAN:    Labor: Admit, anticipate normal delivery, routine labor orders  Fetus: Reassuring  GBS: Yes  Other: IOL started last night with cytotec. This am I placed a dickinson balloon inside cervix and filled with 40cc. Will add low dose pitocin as well. GBS + on abx.   Expect NVD

## 2022-01-06 NOTE — FLOWSHEET NOTE
Dr. Garcia at bedside, [address] discussed and updated. MD provides education on induction of labor and answers all pt questions.

## 2022-01-06 NOTE — FLOWSHEET NOTE
Elpidio Goodman called and updated on patient's BP as well as increase in fluid bolus per Dr Kevin Loza.

## 2022-01-06 NOTE — FLOWSHEET NOTE
Dr Paulette Aguilar called and notified of patient increase in pain, uterine activity, nausea and request for epidural. Telephone orders received for one time dose of 12.5mg Phenergan IV as ordered. Instructed to go ahead with epidural placement. Dr Paulette Aguilar plans to place dickinson bulb after patient is comfortable. Heating pad given for comfort for complaints of low back pain.

## 2022-01-06 NOTE — FLOWSHEET NOTE
TR to Dr. Rosa Maria Keating on pt's recent SVE, EFM tracing, decelerations, nursing interventions, contractions.  MD notified of pt reporting increasing pain and request for epidural. MD gives approval for epidural.

## 2022-01-06 NOTE — FLOWSHEET NOTE
This note also relates to the following rows which could not be included:  SpO2 - Cannot attach notes to unvalidated device data    Dr Georgia Galvin at bedside SVE performed and dickinson bulb placed. Patient tolerated procedure well. Updated on plan of care.

## 2022-01-06 NOTE — PROGRESS NOTES
S; pt comfortable  O FHT cat 1     CE 4/70/-2    AROM light mec    IUPC and IFM placed  A/P continue IOL.   Will add pitocin PRN

## 2022-01-06 NOTE — L&D DELIVERY NOTE
Mother's Information    Labor Events     labor?: No  Rupture type: Artificial=AROM, Intact  Fluid color: Meconium  Fluid odor: None     Mother Delivery Information    Surgical or Additional Est. Blood Loss (mL): 0 (View Only): Edit in Flowsheets   Combined Est. Blood Loss (mL): 0        [place] File Pending Thelma Thompson [[id number]]    Labor Events     labor?: No  Cervical ripening date/time: 22 21:43:00   Cervical ripening type: Misoprostol  Antibiotics received during labor?: Yes  Rupture Identifier: Sac 1   Rupture date/time: 22 11:26:00   Rupture type: Artificial=AROM, Intact  Fluid color: Meconium  Fluid odor: None  Induction: Misoprostol  Augmentation: AROM          Labor Event Times    Labor onset date/time: 22 1126   Dilation complete date/time:      Start pushing:    Decision time (emergent ): Anesthesia    Method: Epidural     Assisted Delivery Details    Forceps attempted?: No  Vacuum extractor attempted?: No     Document Additional Attempt       Document Additional Attempt             Shoulder Dystocia    Shoulder dystocia present?: No  Add Second Maneuver  Add Third Maneuver  Add Fourth Maneuver  Add Fifth Maneuver  Add Sixth Maneuver  Add Seventh Maneuver  Add Eighth Maneuver  Add Ninth Maneuver      Presentation    Presentation: Vertex  _: Occiput  _: Anterior      Information    Head delivery date/time: 2022 17:20:00   Changing the 's delivery date/time could affect patient care.:    Delivery date/time:  22 1720   Delivery type: , Low Transverse    Details:  Trial of labor?: Yes    categorization: Primary    priority: Non-scheduled   Indications for : Fetal Intolerance of Labor   Skin Incision Type: Pfannenstiel   Uterine Incision: Low Transverse         Delivery Providers    Delivering clinician: Mio Casey MD   Provider Role    Cortney Wheeler RN Registered Nurse    Bridger Daly. BERT Hartley       Cord    Vessels: 3 Vessels  Complications: None  Cord blood disposition: Lab  Gases sent?: No  Stem cell collection (by provider): No     Placenta    Date/time: 2022 17:21:00  Removal: Spontaneous  Appearance: Intact  Disposition: Refrigerator     [place] Measurements       Delivery Information    Surgical or additional est. blood loss (mL): 0 (View Only): Edit in Flowsheets   Combined est. blood loss (mL): 0     Other Procedures    Procedures: None        PATIENT:    Jazmin Funez    PREOPERATIVE DIAGNOSES:  1.  39w5d        2. Fetal intolerance to labor     POSTOPERATIVE DIAGNOSES:  Same      PROCEDURE:     Primary low transverse  section. SURGEON:     Maribel Garnett M.D. ASST:     None      ESTIMATED BLOOD LOSS:   500    FLUID REPLACEMENT  500    URINE OUTPUT   20      COMPLICATIONS:     None. ANESTHESIA:    Epidural.         FINDINGS:   Live male  Named [place]       Infant Apgars 8 and 9 at 1 and 5 min respectively. Weight: 7 lbs 5 oz. Normal tubes and ovaries bilaterally. DETAILS OF PROCEDURE:   The patient was taken to the operative room and anesthesia was given without any complications. She was then placed in the supine position slightly tilted to the left. Abdomen was prepped and draped in the usual manner. Anesthesia level was checked and was found to be adequate. The abdomen was entered and carried down sharply to the fascia. The fascia was entered and  from the underlying rectus abdominis muscles which were  in the midline exposing the parietal peritoneum. The peritoneum was opened sharply in a longitudinal fashion. A bladder retractor was placed in position and the visceral peritoneum overlying the lower uterine segment was opened transversely and a bladder flap was developed in a sharp manner. The lower uterine segment was opened in a low transverse fashion.  The amniotic cavity was entered and  amniotic fluid was suctioned out. The baby was then delivered from the Cephalic position. Cord was clamped and cut and the baby was handed over to the nursery nurse. Cord blood was saved. The placenta was then removed manually and the endometrial cavity was swept clean by a wet lap. The edges of the uterine incision were grasped by Allis clamps and the incision itself was closed using a continuous locked suture of 0 vicryl. A second layer assured excellent hemostasis. Tubes and ovaries were inspected and appeared to be normal. The gutters were cleared of any blood clots and debris. The operative field appeared to be hemostatic. Peritoneum was then closed with running stitch of 3.0 vicryl, fascia closed with 0 vicryl, subcutaneous tissues re-approximated with interrupted 3.0 vicryl, and skin closed with 3.0 monocryl, steri strips and a dressing. The patient was then transferred to the recovery room in good stable condition. All sponge needle and instrument counts found to be correct. The patient was given antibiotics prior to the procedure for wound prophylaxis.     Electronically signed by Alejandra Mike MD on 1/6/2022 at 5:47 PM

## 2022-01-07 LAB
BASOPHILS ABSOLUTE: 0 K/CU MM
BASOPHILS RELATIVE PERCENT: 0.2 % (ref 0–1)
DIFFERENTIAL TYPE: ABNORMAL
EOSINOPHILS ABSOLUTE: 0 K/CU MM
EOSINOPHILS RELATIVE PERCENT: 0.2 % (ref 0–3)
HCT VFR BLD CALC: 30.1 % (ref 37–47)
HEMOGLOBIN: 9.5 GM/DL (ref 12.5–16)
IMMATURE NEUTROPHIL %: 0.8 % (ref 0–0.43)
LYMPHOCYTES ABSOLUTE: 1.5 K/CU MM
LYMPHOCYTES RELATIVE PERCENT: 12.8 % (ref 25–45)
MCH RBC QN AUTO: 27.5 PG (ref 27–31)
MCHC RBC AUTO-ENTMCNC: 31.6 % (ref 32–36)
MCV RBC AUTO: 87.2 FL (ref 78–100)
MONOCYTES ABSOLUTE: 0.5 K/CU MM
MONOCYTES RELATIVE PERCENT: 4.1 % (ref 0–4)
NUCLEATED RBC %: 0 %
PDW BLD-RTO: 12.3 % (ref 11.7–14.9)
PLATELET # BLD: 152 K/CU MM (ref 140–440)
PMV BLD AUTO: 10.3 FL (ref 7.5–11.1)
RBC # BLD: 3.45 M/CU MM (ref 4.2–5.4)
SEGMENTED NEUTROPHILS ABSOLUTE COUNT: 9.7 K/CU MM
SEGMENTED NEUTROPHILS RELATIVE PERCENT: 81.9 % (ref 34–64)
TOTAL IMMATURE NEUTOROPHIL: 0.1 K/CU MM
TOTAL NUCLEATED RBC: 0 K/CU MM
WBC # BLD: 11.9 K/CU MM (ref 4–10.5)

## 2022-01-07 PROCEDURE — 85025 COMPLETE CBC W/AUTO DIFF WBC: CPT

## 2022-01-07 PROCEDURE — 6370000000 HC RX 637 (ALT 250 FOR IP): Performed by: OBSTETRICS & GYNECOLOGY

## 2022-01-07 PROCEDURE — 1220000000 HC SEMI PRIVATE OB R&B

## 2022-01-07 PROCEDURE — 6360000002 HC RX W HCPCS: Performed by: OBSTETRICS & GYNECOLOGY

## 2022-01-07 PROCEDURE — 36415 COLL VENOUS BLD VENIPUNCTURE: CPT

## 2022-01-07 RX ORDER — IBUPROFEN 800 MG/1
800 TABLET ORAL EVERY 8 HOURS SCHEDULED
Status: DISCONTINUED | OUTPATIENT
Start: 2022-01-07 | End: 2022-01-09 | Stop reason: HOSPADM

## 2022-01-07 RX ADMIN — ACYCLOVIR 400 MG: 200 CAPSULE ORAL at 12:00

## 2022-01-07 RX ADMIN — ACETAMINOPHEN 650 MG: 325 TABLET ORAL at 13:48

## 2022-01-07 RX ADMIN — DOCUSATE SODIUM 100 MG: 100 CAPSULE, LIQUID FILLED ORAL at 20:37

## 2022-01-07 RX ADMIN — IBUPROFEN 800 MG: 800 TABLET, FILM COATED ORAL at 20:37

## 2022-01-07 RX ADMIN — OXYCODONE AND ACETAMINOPHEN 1 TABLET: 5; 325 TABLET ORAL at 21:43

## 2022-01-07 RX ADMIN — DOCUSATE SODIUM 100 MG: 100 CAPSULE, LIQUID FILLED ORAL at 12:00

## 2022-01-07 RX ADMIN — KETOROLAC TROMETHAMINE 30 MG: 30 INJECTION, SOLUTION INTRAMUSCULAR at 05:05

## 2022-01-07 RX ADMIN — ACYCLOVIR 400 MG: 200 CAPSULE ORAL at 18:42

## 2022-01-07 RX ADMIN — IBUPROFEN 800 MG: 800 TABLET, FILM COATED ORAL at 12:01

## 2022-01-07 RX ADMIN — ENOXAPARIN SODIUM 40 MG: 100 INJECTION SUBCUTANEOUS at 12:01

## 2022-01-07 RX ADMIN — ACETAMINOPHEN 650 MG: 325 TABLET ORAL at 17:39

## 2022-01-07 RX ADMIN — FERROUS SULFATE TAB 325 MG (65 MG ELEMENTAL FE) 325 MG: 325 (65 FE) TAB at 17:39

## 2022-01-07 ASSESSMENT — PAIN DESCRIPTION - PROGRESSION
CLINICAL_PROGRESSION: GRADUALLY WORSENING

## 2022-01-07 ASSESSMENT — PAIN DESCRIPTION - FREQUENCY
FREQUENCY: INTERMITTENT
FREQUENCY: CONTINUOUS
FREQUENCY: INTERMITTENT

## 2022-01-07 ASSESSMENT — PAIN DESCRIPTION - LOCATION
LOCATION: ABDOMEN

## 2022-01-07 ASSESSMENT — PAIN DESCRIPTION - ORIENTATION
ORIENTATION: LOWER
ORIENTATION: MID;LOWER
ORIENTATION: LOWER

## 2022-01-07 ASSESSMENT — PAIN SCALES - GENERAL
PAINLEVEL_OUTOF10: 5
PAINLEVEL_OUTOF10: 0
PAINLEVEL_OUTOF10: 0
PAINLEVEL_OUTOF10: 3
PAINLEVEL_OUTOF10: 3
PAINLEVEL_OUTOF10: 0
PAINLEVEL_OUTOF10: 3
PAINLEVEL_OUTOF10: 3
PAINLEVEL_OUTOF10: 0
PAINLEVEL_OUTOF10: 2

## 2022-01-07 ASSESSMENT — PAIN DESCRIPTION - PAIN TYPE
TYPE: ACUTE PAIN;SURGICAL PAIN
TYPE: ACUTE PAIN
TYPE: ACUTE PAIN;SURGICAL PAIN
TYPE: ACUTE PAIN
TYPE: ACUTE PAIN

## 2022-01-07 ASSESSMENT — PAIN DESCRIPTION - DESCRIPTORS
DESCRIPTORS: CRAMPING;DISCOMFORT;SORE
DESCRIPTORS: DISCOMFORT;SORE
DESCRIPTORS: DISCOMFORT;SORE
DESCRIPTORS: CRAMPING
DESCRIPTORS: CRAMPING

## 2022-01-07 ASSESSMENT — PAIN - FUNCTIONAL ASSESSMENT
PAIN_FUNCTIONAL_ASSESSMENT: ACTIVITIES ARE NOT PREVENTED

## 2022-01-07 ASSESSMENT — PAIN DESCRIPTION - ONSET
ONSET: ON-GOING
ONSET: GRADUAL
ONSET: ON-GOING

## 2022-01-07 NOTE — FLOWSHEET NOTE
Dr Gonzalo Dai called and notified of recurrent variables not responding to interventions. Verbal order received to start amnioinfusion through IUPC with a 200ml bolus and then to gravity. Per Dr Gonzalo Dai okay to continue Pitocin at current rate.

## 2022-01-07 NOTE — PROGRESS NOTES
Subjective:     Postpartum Day 1:  Delivery    The patient feels well. The patient no concerns. emotional concerns. Pain is well controlled with current medications. . Urinary output is adequate. The patient is ambulating well. The patient is tolerating a normal diet. Flatus has been passed. Objective:        Vitals:    22 0505   BP: 116/76   Pulse: 88   Resp: 16   Temp: 98.6 °F (37 °C)   SpO2: 99%       Lab Results   Component Value Date    WBC 13.6 (H) 2022    HGB 10.1 (L) 2022    HCT 31.3 (L) 2022    MCV 86.5 2022     2022       General:    AO, no acute distress       Lochia:  appropriate   Uterine    firm   Incision:  healing well,no significant drainage,no dehiscence,no significant erythema   DVT Evaluation:  no calf tenderness     Assessment:     Status post  section, POD # 1 Doing well postoperatively.     Plan:   Cbc this am  Continue plan of care    Nini Fairchild MD  2022  7:24 AM

## 2022-01-07 NOTE — LACTATION NOTE
Visited. Mom says baby is breast feeding well. She denies soreness or discomfort, but she has been given lanolin cream for PRN use. I discussed feeding POC and feeding cues. Mom is encouraged to call for assistance PRN.       Tena Mills

## 2022-01-07 NOTE — FLOWSHEET NOTE
Mother transferred to MB  via bed by RN. Baby transferred to MB in mother's arms. Oriented to room and call light system. Call light within reach, bed in lowest position, bed wheels locked. Pt denies any needs at this time. Baby resting in bassinet, pink, alert, without s/s of distress. Report on couplet to postpartum nurse at bedside.

## 2022-01-07 NOTE — PLAN OF CARE
Problem: Fluid Volume - Imbalance:  Goal: Absence of imbalanced fluid volume signs and symptoms  Description: Absence of imbalanced fluid volume signs and symptoms  1/6/2022 2152 by Colten Leos RN  Outcome: Ongoing  1/6/2022 2022 by Kamaljit Bruce. Christ Gil RN  Outcome: Met This Shift  Goal: Absence of intrapartum hemorrhage signs and symptoms  Description: Absence of intrapartum hemorrhage signs and symptoms  1/6/2022 2152 by Colten Leos RN  Outcome: Ongoing  1/6/2022 2022 by Kamaljit Bruce. Christ Gil RN  Outcome: Met This Shift  Goal: Absence of postpartum hemorrhage signs and symptoms  Description: Absence of postpartum hemorrhage signs and symptoms  Outcome: Ongoing     Problem: Infection - Intrapartum Infection:  Goal: Will show no infection signs and symptoms  Description: Will show no infection signs and symptoms  1/6/2022 2152 by Colten Leos RN  Outcome: Ongoing  1/6/2022 2022 by Kamaljit Bruce. Christ Gil RN  Outcome: Met This Shift     Problem: Pain - Acute:  Goal: Pain level will decrease  Description: Pain level will decrease  1/6/2022 2152 by Colten Leos RN  Outcome: Ongoing  1/6/2022 2022 by Kamaljit Bruce. Christ Gil RN  Outcome: Met This Shift  Goal: Able to cope with pain  Description: Able to cope with pain  1/6/2022 2152 by Colten Leos RN  Outcome: Ongoing  1/6/2022 2022 by Kamaljit Bruce. Christ Gil RN  Outcome: Met This Shift     Problem: Urinary Retention:  Goal: Experiences of bladder distention will decrease  Description: Experiences of bladder distention will decrease  1/6/2022 2152 by Colten Leos RN  Outcome: Ongoing  1/6/2022 2022 by Kamaljit Bruce. Christ Gil RN  Outcome: Met This Shift  Goal: Urinary elimination within specified parameters  Description: Urinary elimination within specified parameters  1/6/2022 2152 by Colten Leos RN  Outcome: Ongoing  1/6/2022 2022 by Kamaljit Bruce.  Christ Gil RN  Outcome: Met This Shift     Problem: Pain:  Goal: Pain level will decrease  Description: Pain level will decrease  1/6/2022 2152 by Mariela Gomez RN  Outcome: Ongoing  1/6/2022 2022 by Ayla Bobo. Kelsey Wheeler RN  Outcome: Met This Shift  Goal: Control of acute pain  Description: Control of acute pain  1/6/2022 2152 by Mariela Gomez RN  Outcome: Ongoing  1/6/2022 2022 by Ayla Bobo. Kelsey Wheeler RN  Outcome: Met This Shift  Goal: Control of chronic pain  Description: Control of chronic pain  1/6/2022 2152 by Mariela Gomez RN  Outcome: Ongoing  1/6/2022 2022 by Ayla Bobo. Kelsey Wheeler RN  Outcome: Met This Shift     Problem: Discharge Planning:  Goal: Discharged to appropriate level of care  Description: Discharged to appropriate level of care  Outcome: Ongoing     Problem: Infection - Surgical Site:  Goal: Will show no infection signs and symptoms  Description: Will show no infection signs and symptoms  1/6/2022 2152 by Mariela Gomez RN  Outcome: Ongoing  1/6/2022 2022 by Ayla Bobo.  Kelsey Wheeler RN  Outcome: Met This Shift     Problem: Mood - Altered:  Goal: Mood stable  Description: Mood stable  Outcome: Ongoing     Problem: Nausea/Vomiting:  Goal: Absence of nausea/vomiting  Description: Absence of nausea/vomiting  Outcome: Ongoing     Problem: Venous Thromboembolism:  Goal: Will show no signs or symptoms of venous thromboembolism  Description: Will show no signs or symptoms of venous thromboembolism  Outcome: Ongoing  Goal: Absence of signs or symptoms of impaired coagulation  Description: Absence of signs or symptoms of impaired coagulation  Outcome: Ongoing

## 2022-01-08 LAB — RUBELLA ANTIBODY IGG: 14.7 IU/ML

## 2022-01-08 PROCEDURE — 6370000000 HC RX 637 (ALT 250 FOR IP): Performed by: OBSTETRICS & GYNECOLOGY

## 2022-01-08 PROCEDURE — 6360000002 HC RX W HCPCS: Performed by: OBSTETRICS & GYNECOLOGY

## 2022-01-08 PROCEDURE — 1220000000 HC SEMI PRIVATE OB R&B

## 2022-01-08 RX ADMIN — ACYCLOVIR 400 MG: 200 CAPSULE ORAL at 08:38

## 2022-01-08 RX ADMIN — DOCUSATE SODIUM 100 MG: 100 CAPSULE, LIQUID FILLED ORAL at 08:37

## 2022-01-08 RX ADMIN — IBUPROFEN 800 MG: 800 TABLET, FILM COATED ORAL at 20:09

## 2022-01-08 RX ADMIN — FERROUS SULFATE TAB 325 MG (65 MG ELEMENTAL FE) 325 MG: 325 (65 FE) TAB at 08:38

## 2022-01-08 RX ADMIN — ACYCLOVIR 400 MG: 200 CAPSULE ORAL at 14:48

## 2022-01-08 RX ADMIN — ENOXAPARIN SODIUM 40 MG: 100 INJECTION SUBCUTANEOUS at 12:27

## 2022-01-08 RX ADMIN — FERROUS SULFATE TAB 325 MG (65 MG ELEMENTAL FE) 325 MG: 325 (65 FE) TAB at 17:18

## 2022-01-08 RX ADMIN — OXYCODONE AND ACETAMINOPHEN 1 TABLET: 5; 325 TABLET ORAL at 18:28

## 2022-01-08 RX ADMIN — ACYCLOVIR 400 MG: 200 CAPSULE ORAL at 01:12

## 2022-01-08 RX ADMIN — ACYCLOVIR 400 MG: 200 CAPSULE ORAL at 20:09

## 2022-01-08 RX ADMIN — DOCUSATE SODIUM 100 MG: 100 CAPSULE, LIQUID FILLED ORAL at 20:09

## 2022-01-08 RX ADMIN — IBUPROFEN 800 MG: 800 TABLET, FILM COATED ORAL at 05:02

## 2022-01-08 RX ADMIN — OXYCODONE AND ACETAMINOPHEN 1 TABLET: 5; 325 TABLET ORAL at 08:38

## 2022-01-08 RX ADMIN — IBUPROFEN 800 MG: 800 TABLET, FILM COATED ORAL at 12:26

## 2022-01-08 ASSESSMENT — PAIN DESCRIPTION - ONSET: ONSET: GRADUAL

## 2022-01-08 ASSESSMENT — PAIN DESCRIPTION - DESCRIPTORS: DESCRIPTORS: DISCOMFORT;SORE

## 2022-01-08 ASSESSMENT — PAIN DESCRIPTION - PROGRESSION
CLINICAL_PROGRESSION: GRADUALLY IMPROVING
CLINICAL_PROGRESSION: GRADUALLY WORSENING

## 2022-01-08 ASSESSMENT — PAIN DESCRIPTION - LOCATION: LOCATION: ABDOMEN

## 2022-01-08 ASSESSMENT — PAIN SCALES - GENERAL
PAINLEVEL_OUTOF10: 0
PAINLEVEL_OUTOF10: 5
PAINLEVEL_OUTOF10: 0
PAINLEVEL_OUTOF10: 5
PAINLEVEL_OUTOF10: 0
PAINLEVEL_OUTOF10: 3

## 2022-01-08 ASSESSMENT — PAIN - FUNCTIONAL ASSESSMENT
PAIN_FUNCTIONAL_ASSESSMENT: ACTIVITIES ARE NOT PREVENTED
PAIN_FUNCTIONAL_ASSESSMENT: ACTIVITIES ARE NOT PREVENTED

## 2022-01-08 ASSESSMENT — PAIN DESCRIPTION - PAIN TYPE: TYPE: ACUTE PAIN;SURGICAL PAIN

## 2022-01-08 ASSESSMENT — PAIN DESCRIPTION - FREQUENCY: FREQUENCY: INTERMITTENT

## 2022-01-08 ASSESSMENT — PAIN DESCRIPTION - ORIENTATION: ORIENTATION: MID;LOWER

## 2022-01-08 NOTE — PROGRESS NOTES
Subjective:     Postpartum Day 2:  Delivery    The patient feels well. The patient no concerns. emotional concerns. Pain is well controlled with current medications. . Urinary output is adequate. The patient is ambulating well. The patient is tolerating a normal diet. Flatus has been passed. Objective:        Vitals:    22 0502   BP: 121/71   Pulse: 62   Resp: 16   Temp:    SpO2: 99%       Lab Results   Component Value Date    WBC 11.9 (H) 2022    HGB 9.5 (L) 2022    HCT 30.1 (L) 2022    MCV 87.2 2022     2022       General:    AO, no acute distress       Lochia:  appropriate   Uterine    firm   Incision:  healing well,no significant drainage,no dehiscence,no significant erythema   DVT Evaluation:  no calf tenderness     Assessment:     Status post  section, POD # 2 Doing well postoperatively.     Plan:     Continue plan of care    Pk Elliott MD  2022  7:30 AM

## 2022-01-08 NOTE — PLAN OF CARE
Problem: Fluid Volume - Imbalance:  Goal: Absence of imbalanced fluid volume signs and symptoms  Description: Absence of imbalanced fluid volume signs and symptoms  Outcome: Ongoing  Goal: Absence of intrapartum hemorrhage signs and symptoms  Description: Absence of intrapartum hemorrhage signs and symptoms  Outcome: Ongoing  Goal: Absence of postpartum hemorrhage signs and symptoms  Description: Absence of postpartum hemorrhage signs and symptoms  Outcome: Ongoing     Problem: Infection - Intrapartum Infection:  Goal: Will show no infection signs and symptoms  Description: Will show no infection signs and symptoms  Outcome: Ongoing     Problem: Pain - Acute:  Goal: Pain level will decrease  Description: Pain level will decrease  Outcome: Ongoing  Goal: Able to cope with pain  Description: Able to cope with pain  Outcome: Ongoing     Problem: Urinary Retention:  Goal: Experiences of bladder distention will decrease  Description: Experiences of bladder distention will decrease  Outcome: Ongoing  Goal: Urinary elimination within specified parameters  Description: Urinary elimination within specified parameters  Outcome: Ongoing     Problem: Pain:  Goal: Pain level will decrease  Description: Pain level will decrease  Outcome: Ongoing  Goal: Control of acute pain  Description: Control of acute pain  Outcome: Ongoing  Goal: Control of chronic pain  Description: Control of chronic pain  Outcome: Ongoing     Problem: Discharge Planning:  Goal: Discharged to appropriate level of care  Description: Discharged to appropriate level of care  Outcome: Ongoing     Problem: Infection - Surgical Site:  Goal: Will show no infection signs and symptoms  Description: Will show no infection signs and symptoms  Outcome: Ongoing     Problem: Mood - Altered:  Goal: Mood stable  Description: Mood stable  Outcome: Ongoing     Problem: Nausea/Vomiting:  Goal: Absence of nausea/vomiting  Description: Absence of nausea/vomiting  Outcome: Ongoing     Problem: Venous Thromboembolism:  Goal: Will show no signs or symptoms of venous thromboembolism  Description: Will show no signs or symptoms of venous thromboembolism  Outcome: Ongoing  Goal: Absence of signs or symptoms of impaired coagulation  Description: Absence of signs or symptoms of impaired coagulation  Outcome: Ongoing

## 2022-01-08 NOTE — PLAN OF CARE
Problem: Anxiety:  Goal: Level of anxiety will decrease  Description: Level of anxiety will decrease  Outcome: Completed     Problem: Breathing Pattern - Ineffective:  Goal: Able to breathe comfortably  Description: Able to breathe comfortably  Outcome: Completed     Problem: Fluid Volume - Imbalance:  Goal: Absence of imbalanced fluid volume signs and symptoms  Description: Absence of imbalanced fluid volume signs and symptoms  Outcome: Completed  Goal: Absence of intrapartum hemorrhage signs and symptoms  Description: Absence of intrapartum hemorrhage signs and symptoms  Outcome: Completed  Goal: Absence of postpartum hemorrhage signs and symptoms  Description: Absence of postpartum hemorrhage signs and symptoms  Outcome: Completed     Problem: Infection - Intrapartum Infection:  Goal: Will show no infection signs and symptoms  Description: Will show no infection signs and symptoms  Outcome: Completed     Problem: Labor Process - Prolonged:  Goal: Labor progression, first stage, within specified pattern  Description: Labor progression, first stage, within specified pattern  Outcome: Completed  Goal: Labor progession, second stage, within specified pattern  Description: Labor progession, second stage, within specified pattern  Outcome: Completed  Goal: Uterine contractions within specified parameters  Description: Uterine contractions within specified parameters  Outcome: Completed     Problem:  Screening:  Goal: Ability to make informed decisions regarding treatment has improved  Description: Ability to make informed decisions regarding treatment has improved  Outcome: Completed     Problem: Pain - Acute:  Goal: Pain level will decrease  Description: Pain level will decrease  Outcome: Met This Shift  Goal: Able to cope with pain  Description: Able to cope with pain  Outcome: Completed     Problem: Tissue Perfusion - Uteroplacental, Altered:  Goal: Absence of abnormal fetal heart rate pattern  Description: Absence of abnormal fetal heart rate pattern  Outcome: Completed     Problem: Urinary Retention:  Goal: Experiences of bladder distention will decrease  Description: Experiences of bladder distention will decrease  Outcome: Completed  Goal: Urinary elimination within specified parameters  Description: Urinary elimination within specified parameters  Outcome: Completed     Problem: Pain:  Goal: Pain level will decrease  Description: Pain level will decrease  Outcome: Met This Shift  Goal: Control of acute pain  Description: Control of acute pain  Outcome: Met This Shift  Goal: Control of chronic pain  Description: Control of chronic pain  Outcome: Ongoing     Problem: Infection - Surgical Site:  Goal: Will show no infection signs and symptoms  Description: Will show no infection signs and symptoms  Outcome: Completed

## 2022-01-09 VITALS
DIASTOLIC BLOOD PRESSURE: 60 MMHG | SYSTOLIC BLOOD PRESSURE: 104 MMHG | OXYGEN SATURATION: 95 % | TEMPERATURE: 98.1 F | HEART RATE: 83 BPM | WEIGHT: 177 LBS | BODY MASS INDEX: 29.49 KG/M2 | RESPIRATION RATE: 16 BRPM | HEIGHT: 65 IN

## 2022-01-09 PROBLEM — Z33.1 PREGNANCY AS INCIDENTAL FINDING: Status: RESOLVED | Noted: 2021-11-28 | Resolved: 2022-01-09

## 2022-01-09 PROCEDURE — 6370000000 HC RX 637 (ALT 250 FOR IP): Performed by: OBSTETRICS & GYNECOLOGY

## 2022-01-09 PROCEDURE — 6360000002 HC RX W HCPCS: Performed by: OBSTETRICS & GYNECOLOGY

## 2022-01-09 RX ORDER — IBUPROFEN 800 MG/1
800 TABLET ORAL EVERY 8 HOURS PRN
Qty: 60 TABLET | Refills: 2 | Status: SHIPPED | OUTPATIENT
Start: 2022-01-09

## 2022-01-09 RX ORDER — OXYCODONE HYDROCHLORIDE AND ACETAMINOPHEN 5; 325 MG/1; MG/1
1 TABLET ORAL EVERY 6 HOURS PRN
Qty: 20 TABLET | Refills: 0 | Status: SHIPPED | OUTPATIENT
Start: 2022-01-09 | End: 2022-01-16

## 2022-01-09 RX ADMIN — OXYCODONE AND ACETAMINOPHEN 1 TABLET: 5; 325 TABLET ORAL at 00:50

## 2022-01-09 RX ADMIN — IBUPROFEN 800 MG: 800 TABLET, FILM COATED ORAL at 03:24

## 2022-01-09 RX ADMIN — DOCUSATE SODIUM 100 MG: 100 CAPSULE, LIQUID FILLED ORAL at 08:08

## 2022-01-09 RX ADMIN — OXYCODONE AND ACETAMINOPHEN 1 TABLET: 5; 325 TABLET ORAL at 08:07

## 2022-01-09 RX ADMIN — ACYCLOVIR 400 MG: 200 CAPSULE ORAL at 08:09

## 2022-01-09 RX ADMIN — FERROUS SULFATE TAB 325 MG (65 MG ELEMENTAL FE) 325 MG: 325 (65 FE) TAB at 08:09

## 2022-01-09 RX ADMIN — ENOXAPARIN SODIUM 40 MG: 100 INJECTION SUBCUTANEOUS at 08:10

## 2022-01-09 ASSESSMENT — PAIN DESCRIPTION - ORIENTATION: ORIENTATION: LOWER

## 2022-01-09 ASSESSMENT — PAIN DESCRIPTION - PAIN TYPE: TYPE: ACUTE PAIN

## 2022-01-09 ASSESSMENT — PAIN DESCRIPTION - FREQUENCY: FREQUENCY: INTERMITTENT

## 2022-01-09 ASSESSMENT — PAIN DESCRIPTION - PROGRESSION: CLINICAL_PROGRESSION: GRADUALLY WORSENING

## 2022-01-09 ASSESSMENT — PAIN - FUNCTIONAL ASSESSMENT: PAIN_FUNCTIONAL_ASSESSMENT: ACTIVITIES ARE NOT PREVENTED

## 2022-01-09 ASSESSMENT — PAIN DESCRIPTION - LOCATION: LOCATION: ABDOMEN

## 2022-01-09 ASSESSMENT — PAIN SCALES - GENERAL
PAINLEVEL_OUTOF10: 5
PAINLEVEL_OUTOF10: 0
PAINLEVEL_OUTOF10: 0
PAINLEVEL_OUTOF10: 5

## 2022-01-09 ASSESSMENT — PAIN DESCRIPTION - ONSET: ONSET: GRADUAL

## 2022-01-09 ASSESSMENT — PAIN DESCRIPTION - DESCRIPTORS: DESCRIPTORS: CRAMPING

## 2022-01-09 NOTE — LACTATION NOTE
Mother to nursery to take infant back to her room. Mother states she does want to breast feed but that she can not always get infant to latch on. Mother states she also has been using electric breast pump. Discuss with mother different position changes: side lying, cradle hold, and football hold. Discuss with mother that breast feeding babies should breast feed every 2-3 hours for 10 to 15 minutes on each side. Also discuss with mother to listen and watch for infant feeding cues and that the baby may want to breast feed more frequently. Discuss with mother that she has colostrum for the first few days until her milk comes in and that this is enough for the baby the first few days. Explained to mother that the stomach of the baby is small so it fills up quickly and then empties quickly and that is why the infant needs to breast feed frequently. Discuss with mother that she needs to hold the infant head securely during feedings and to hold her breast with her hand to help guide the breast in infant mouth, and that the infant needs to have a deep latch on, more than just the nipple. Explained to mother that this helps stimulate the milk ducts which are farther back on the breast to produce and release milk and also helps to decrease soreness. Explained to mother that if the baby latches on to just the nipple it will increase soreness for her. Discuss with mother that when the infant is latched on well, he will suckle and then take rest from suckling, but that he should stay latched on and that he should suckle more than pause. Lanolin ointment given to mother and explain how to use on nipple to help if nipples become sore. Encouraged mother to allow nipples to air dry after feedings to also help decrease soreness. Mother verbalizes understanding. Mother ask appropriate questions. Encouraged mother to call for any assistance with breast feeding or any other needs.   Leanne Deng RN, IBCLC

## 2022-01-09 NOTE — PLAN OF CARE
Problem: Anxiety:  Goal: Level of anxiety will decrease  Description: Level of anxiety will decrease  1/8/2022 1549 by Sole Ashley RN  Outcome: Completed     Problem: Anxiety:  Goal: Level of anxiety will decrease  Description: Level of anxiety will decrease  1/8/2022 1549 by Sole Ashley RN  Outcome: Completed     Problem: Anxiety:  Goal: Level of anxiety will decrease  Description: Level of anxiety will decrease  1/8/2022 1549 by Sole Ashley RN  Outcome: Completed

## 2022-01-09 NOTE — DISCHARGE SUMMARY
Obstetrical Discharge Form    Gestational Age:  38w11d    Antepartum complications: none    Date of Delivery:    22    Type of Delivery:    for Nonreassuring fetal heart rate tracing    Delivered By:                 Vonda Kuo:       Information for the patient's :  Christianacarolynn Yepez [[id number]]        Anesthesia:    Epidural    Intrapartum complications: None    Postpartum complications: none    Vitals:    22 0757   BP: 115/67   Pulse: 63   Resp: 17   Temp: 98 °F (36.7 °C)   SpO2: 100%     Lab Results   Component Value Date    WBC 11.9 (H) 2022    HGB 9.5 (L) 2022    HCT 30.1 (L) 2022    MCV 87.2 2022     2022       Condition at discharge: Good/stable      Discharge Date: 21      Plan:   Follow up in 1-2 weeks.

## 2022-01-10 NOTE — FLOWSHEET NOTE
ID bands checked. Infant's ID band removed and stapled to footprint sheet, the mother verified as correct, signed and witnessed by RN. Hugs tag removed. Mother of baby signed Safe Baby Crib Form verifying that she does have a safe crib for baby at home. Discharge instructions given and reviewed. Patient voiced understanding. Rx's for  Ibuprofen,  Percocet  reviewed and given. Written and verbal education provided about opiate side effects and possibility of addiction. Patient voiced understanding. Patient is ambulating well at discharge with pain #0. Pt's mother is driving patient and baby home. Mother verbalizes understanding to follow-up with Pediatric Provider, Dr. Marshall Nicole in  3 days, and OB Provider Dr. Efrain Redman in 1-2  weeks as instructed. Baby pink, harnessed into carseat at discharge by parents. Parents and baby escorted to hospital exit by [facility].

## 2022-01-10 NOTE — PLAN OF CARE
Problem: Pain - Acute:  Goal: Pain level will decrease  Description: Pain level will decrease  Outcome: Completed     Problem: Pain:  Goal: Pain level will decrease  Description: Pain level will decrease  Outcome: Completed     Problem: Discharge Planning:  Goal: Discharged to appropriate level of care  Description: Discharged to appropriate level of care  Outcome: Completed     Problem: Mood - Altered:  Goal: Mood stable  Description: Mood stable  Outcome: Completed     Problem: Nausea/Vomiting:  Goal: Absence of nausea/vomiting  Description: Absence of nausea/vomiting  Outcome: Completed     Problem: Venous Thromboembolism:  Goal: Will show no signs or symptoms of venous thromboembolism  Description: Will show no signs or symptoms of venous thromboembolism  Outcome: Completed  Goal: Absence of signs or symptoms of impaired coagulation  Description: Absence of signs or symptoms of impaired coagulation  Outcome: Completed

## 2022-01-12 LAB
HIV-1 COPIES/ML: NOT DETECTED CPY/ML
INTERPRETATION: NOT DETECTED
LOG COPIES/ML (ULTRA): NOT DETECTED LOG CPY/ML

## 2022-04-22 ENCOUNTER — HOSPITAL ENCOUNTER (EMERGENCY)
Age: 22
Discharge: LEFT BEFORE DISCHARGE | End: 2022-04-22

## 2022-04-22 VITALS
SYSTOLIC BLOOD PRESSURE: 105 MMHG | DIASTOLIC BLOOD PRESSURE: 77 MMHG | HEART RATE: 129 BPM | OXYGEN SATURATION: 99 % | RESPIRATION RATE: 22 BRPM | TEMPERATURE: 101.9 F

## 2022-04-22 RX ORDER — IBUPROFEN 400 MG/1
800 TABLET ORAL ONCE
Status: DISCONTINUED | OUTPATIENT
Start: 2022-04-22 | End: 2022-04-23 | Stop reason: HOSPADM

## 2022-04-22 RX ORDER — ACETAMINOPHEN 500 MG
1000 TABLET ORAL ONCE
Status: DISCONTINUED | OUTPATIENT
Start: 2022-04-22 | End: 2022-04-23 | Stop reason: HOSPADM

## 2023-04-08 ENCOUNTER — HOSPITAL ENCOUNTER (EMERGENCY)
Age: 23
Discharge: PSYCHIATRIC OR SUBSTANCE USE FACILITY | End: 2023-04-08
Attending: EMERGENCY MEDICINE
Payer: COMMERCIAL

## 2023-04-08 VITALS
SYSTOLIC BLOOD PRESSURE: 119 MMHG | TEMPERATURE: 98 F | HEART RATE: 51 BPM | DIASTOLIC BLOOD PRESSURE: 77 MMHG | RESPIRATION RATE: 18 BRPM | OXYGEN SATURATION: 99 %

## 2023-04-08 DIAGNOSIS — F32.A DEPRESSION WITH SUICIDAL IDEATION: Primary | ICD-10-CM

## 2023-04-08 DIAGNOSIS — R45.850 HOMICIDAL IDEATION: ICD-10-CM

## 2023-04-08 DIAGNOSIS — R45.851 DEPRESSION WITH SUICIDAL IDEATION: Primary | ICD-10-CM

## 2023-04-08 LAB
ACETAMINOPHEN LEVEL: <5 UG/ML (ref 15–30)
ALBUMIN SERPL-MCNC: 4.7 GM/DL (ref 3.4–5)
ALCOHOL SCREEN SERUM: <0.01 %WT/VOL
ALP BLD-CCNC: 73 IU/L (ref 40–129)
ALT SERPL-CCNC: 9 U/L (ref 10–40)
AMPHETAMINES: NEGATIVE
ANION GAP SERPL CALCULATED.3IONS-SCNC: 13 MMOL/L (ref 4–16)
AST SERPL-CCNC: 18 IU/L (ref 15–37)
BACTERIA: ABNORMAL /HPF
BARBITURATE SCREEN URINE: NEGATIVE
BASOPHILS ABSOLUTE: 0 K/CU MM
BASOPHILS RELATIVE PERCENT: 0.5 % (ref 0–1)
BENZODIAZEPINE SCREEN, URINE: NEGATIVE
BILIRUB SERPL-MCNC: 0.5 MG/DL (ref 0–1)
BILIRUBIN URINE: NEGATIVE MG/DL
BLOOD, URINE: NEGATIVE
BUN SERPL-MCNC: 14 MG/DL (ref 6–23)
CALCIUM SERPL-MCNC: 9.4 MG/DL (ref 8.3–10.6)
CANNABINOID SCREEN URINE: ABNORMAL
CHLORIDE BLD-SCNC: 104 MMOL/L (ref 99–110)
CLARITY: CLEAR
CO2: 24 MMOL/L (ref 21–32)
COCAINE METABOLITE: NEGATIVE
COLOR: YELLOW
CREAT SERPL-MCNC: 0.6 MG/DL (ref 0.6–1.1)
DIFFERENTIAL TYPE: ABNORMAL
DOSE AMOUNT: ABNORMAL
DOSE AMOUNT: ABNORMAL
DOSE TIME: ABNORMAL
DOSE TIME: ABNORMAL
EOSINOPHILS ABSOLUTE: 0 K/CU MM
EOSINOPHILS RELATIVE PERCENT: 0.3 % (ref 0–3)
GFR SERPL CREATININE-BSD FRML MDRD: >60 ML/MIN/1.73M2
GLUCOSE SERPL-MCNC: 85 MG/DL (ref 70–99)
GLUCOSE, URINE: NEGATIVE MG/DL
GONADOTROPIN, CHORIONIC (HCG) QUANT: <1 UIU/ML
HCT VFR BLD CALC: 40.9 % (ref 37–47)
HEMOGLOBIN: 13 GM/DL (ref 12.5–16)
IMMATURE NEUTROPHIL %: 0.3 % (ref 0–0.43)
KETONES, URINE: 40 MG/DL
LEUKOCYTE ESTERASE, URINE: NEGATIVE
LYMPHOCYTES ABSOLUTE: 1.5 K/CU MM
LYMPHOCYTES RELATIVE PERCENT: 22.4 % (ref 24–44)
MCH RBC QN AUTO: 26.9 PG (ref 27–31)
MCHC RBC AUTO-ENTMCNC: 31.8 % (ref 32–36)
MCV RBC AUTO: 84.5 FL (ref 78–100)
MONOCYTES ABSOLUTE: 0.4 K/CU MM
MONOCYTES RELATIVE PERCENT: 5.8 % (ref 0–4)
MUCUS: ABNORMAL HPF
NITRITE URINE, QUANTITATIVE: NEGATIVE
NUCLEATED RBC %: 0 %
OPIATES, URINE: NEGATIVE
OXYCODONE: NEGATIVE
PDW BLD-RTO: 12.3 % (ref 11.7–14.9)
PH, URINE: 8 (ref 5–8)
PHENCYCLIDINE, URINE: NEGATIVE
PLATELET # BLD: 258 K/CU MM (ref 140–440)
PMV BLD AUTO: 10.2 FL (ref 7.5–11.1)
POTASSIUM SERPL-SCNC: 3.7 MMOL/L (ref 3.5–5.1)
PROTEIN UA: 30 MG/DL
RAPID INFLUENZA  B AGN: NEGATIVE
RAPID INFLUENZA A AGN: NEGATIVE
RBC # BLD: 4.84 M/CU MM (ref 4.2–5.4)
RBC URINE: 1 /HPF (ref 0–6)
SALICYLATE LEVEL: 0.4 MG/DL (ref 15–30)
SARS-COV-2 RDRP RESP QL NAA+PROBE: NOT DETECTED
SEGMENTED NEUTROPHILS ABSOLUTE COUNT: 4.6 K/CU MM
SEGMENTED NEUTROPHILS RELATIVE PERCENT: 70.7 % (ref 36–66)
SODIUM BLD-SCNC: 141 MMOL/L (ref 135–145)
SOURCE: NORMAL
SPECIFIC GRAVITY UA: 1.02 (ref 1–1.03)
SQUAMOUS EPITHELIAL: 3 /HPF
TOTAL IMMATURE NEUTOROPHIL: 0.02 K/CU MM
TOTAL NUCLEATED RBC: 0 K/CU MM
TOTAL PROTEIN: 7.6 GM/DL (ref 6.4–8.2)
TRICHOMONAS: ABNORMAL /HPF
TSH SERPL DL<=0.005 MIU/L-ACNC: 0.78 UIU/ML (ref 0.27–4.2)
UROBILINOGEN, URINE: 1 MG/DL (ref 0.2–1)
WBC # BLD: 6.5 K/CU MM (ref 4–10.5)
WBC UA: 1 /HPF (ref 0–5)

## 2023-04-08 PROCEDURE — 87804 INFLUENZA ASSAY W/OPTIC: CPT

## 2023-04-08 PROCEDURE — 84702 CHORIONIC GONADOTROPIN TEST: CPT

## 2023-04-08 PROCEDURE — 80061 LIPID PANEL: CPT

## 2023-04-08 PROCEDURE — 84443 ASSAY THYROID STIM HORMONE: CPT

## 2023-04-08 PROCEDURE — 81001 URINALYSIS AUTO W/SCOPE: CPT

## 2023-04-08 PROCEDURE — 80307 DRUG TEST PRSMV CHEM ANLYZR: CPT

## 2023-04-08 PROCEDURE — G0480 DRUG TEST DEF 1-7 CLASSES: HCPCS

## 2023-04-08 PROCEDURE — 6370000000 HC RX 637 (ALT 250 FOR IP): Performed by: EMERGENCY MEDICINE

## 2023-04-08 PROCEDURE — 87635 SARS-COV-2 COVID-19 AMP PRB: CPT

## 2023-04-08 PROCEDURE — 85025 COMPLETE CBC W/AUTO DIFF WBC: CPT

## 2023-04-08 PROCEDURE — 80053 COMPREHEN METABOLIC PANEL: CPT

## 2023-04-08 RX ORDER — NICOTINE 21 MG/24HR
1 PATCH, TRANSDERMAL 24 HOURS TRANSDERMAL DAILY
Status: DISCONTINUED | OUTPATIENT
Start: 2023-04-08 | End: 2023-04-08 | Stop reason: HOSPADM

## 2023-04-08 ASSESSMENT — ENCOUNTER SYMPTOMS
ALLERGIC/IMMUNOLOGIC NEGATIVE: 1
RESPIRATORY NEGATIVE: 1
GASTROINTESTINAL NEGATIVE: 1
EYES NEGATIVE: 1

## 2023-04-08 NOTE — ED NOTES
Hand off report to [facility], RN  04/08/23 7417

## 2023-04-08 NOTE — ED NOTES
Patient referred to Christine Santos for further assistance with placement.      ROSA Finn  04/08/23 0213

## 2023-04-08 NOTE — ED NOTES
[place]:     Accepting:  [facility]    Provider: Isabel Harris, [address] [place] [address] Street: [phone]    SUPERIOR ETA: [address] [place], [place]  04/08/23 [address] [address]  04/08/23 3312
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
## 2023-04-08 NOTE — ED NOTES
Report called to [facility], all questions answered.  ETA of transport 410 Ghassan Hernández RN  04/08/23 5206

## 2023-04-08 NOTE — ED NOTES
Spoke with patient and explained assessment process. Once labs have resulted patient can be assessed. Patient states she has been feeling increasingly depressed and even took a large amount of ibuprofen in attempt to harm self, last week. Patient states she has also felt rage at times. Patient states she knows she needs help and convinced herself to come in to ED for help.       ROSA Garza  04/08/23 6525

## 2023-04-08 NOTE — ED PROVIDER NOTES
file   Tobacco Use    Smoking status: Never    Smokeless tobacco: Never   Vaping Use    Vaping Use: Never used   Substance and Sexual Activity    Alcohol use: No    Drug use: Not Currently     Types: Marijuana [place])     Comment: quit in June 2021,when she was 3-4 months pregnant    Sexual activity: Yes     Partners: Male   Other Topics Concern    Not on file   Social History Narrative    Not on file     Social Determinants of Health     Financial Resource Strain: Not on file   Food Insecurity: Not on file   Transportation Needs: Not on file   Physical Activity: Not on file   Stress: Not on file   Social Connections: Not on file   Intimate Partner Violence: Not on file   Housing Stability: Not on file     Current Facility-Administered Medications   Medication Dose Route Frequency Provider Last Rate Last Admin    nicotine (NICODERM CQ) 14 MG/24HR 1 patch  1 patch TransDERmal Daily Mandy Pippa, DO         Current Outpatient Medications   Medication Sig Dispense Refill    ibuprofen (ADVIL;MOTRIN) 800 MG tablet Take 1 tablet by mouth every 8 hours as needed for Pain 60 tablet 2    acyclovir (ZOVIRAX) 400 MG tablet Take 400 mg by mouth 3 times daily       Prenatal Vit-Fe Fumarate-FA (PRENATAL COMPLETE) 14-0.4 MG TABS Take 1 tablet by mouth daily 60 tablet 1    metoclopramide (REGLAN) 10 MG tablet Take 1 tablet by mouth 4 times daily for 10 days 40 tablet 0      No Known Allergies  Current Facility-Administered Medications   Medication Dose Route Frequency Provider Last Rate Last Admin    nicotine (NICODERM CQ) 14 MG/24HR 1 patch  1 patch TransDERmal Daily Mandy Pippa, DO         Current Outpatient Medications   Medication Sig Dispense Refill    ibuprofen (ADVIL;MOTRIN) 800 MG tablet Take 1 tablet by mouth every 8 hours as needed for Pain 60 tablet 2    acyclovir (ZOVIRAX) 400 MG tablet Take 400 mg by mouth 3 times daily       Prenatal Vit-Fe Fumarate-FA (PRENATAL COMPLETE) 14-0.4 MG TABS Take 1 tablet by mouth

## 2023-04-08 NOTE — ED NOTES
Spoke with [facility] and it is ok to fax packet. Packet faxed for consideration for placement.      ROSA Haywood  04/08/23 1655

## 2023-04-08 NOTE — ED NOTES
Patient will be referred for further evaluation at a psychiatric hospital once she is medically cleared.      ROSA Haywood  04/08/23 3123

## 2023-04-08 NOTE — ED NOTES
Notified [facility] of acceptance . Notified [facility] of transport time of 1930.     Notified patient of transport time and destination     [facility]  04/08/23 2568

## 2023-04-08 NOTE — ACP (ADVANCE CARE PLANNING)
Patient does not have any ACP documents/Medical Power of . LSW notes hospital will follow [place]'s Next of Kin hierarchy in the following descending order for priority:    Guardian  Spouse  [de-identified] of adult Children  Parents  [de-identified] of adult Siblings  Nearest Relative not described above    Per [place]'s Next of Kin hierarchy: Parents parent will be Primary Healthcare Decision Maker.

## 2023-04-08 NOTE — ED NOTES
Pt states she has been overwhelmed at home and having suicidal thoughts with thoughts of harming others as well due to anger. Denies any danger to immediate family or son at home. States she wants to receive help and knows she needs it at this time. Pt is tearful but very cooperative with staff and policies at this time.       Bart Adkins RN  04/08/23 0704

## 2023-05-06 ENCOUNTER — HOSPITAL ENCOUNTER (EMERGENCY)
Age: 23
Discharge: PSYCHIATRIC OR SUBSTANCE USE FACILITY | End: 2023-05-07
Attending: EMERGENCY MEDICINE
Payer: COMMERCIAL

## 2023-05-06 DIAGNOSIS — R45.851 DEPRESSION WITH SUICIDAL IDEATION: Primary | ICD-10-CM

## 2023-05-06 DIAGNOSIS — F32.A DEPRESSION WITH SUICIDAL IDEATION: Primary | ICD-10-CM

## 2023-05-06 LAB
ACETAMINOPHEN LEVEL: <5 UG/ML (ref 15–30)
ALBUMIN SERPL-MCNC: 4.5 GM/DL (ref 3.4–5)
ALCOHOL SCREEN SERUM: <0.01 %WT/VOL
ALP BLD-CCNC: 80 IU/L (ref 40–129)
ALT SERPL-CCNC: 8 U/L (ref 10–40)
ANION GAP SERPL CALCULATED.3IONS-SCNC: 9 MMOL/L (ref 4–16)
AST SERPL-CCNC: 15 IU/L (ref 15–37)
BASOPHILS ABSOLUTE: 0 K/CU MM
BASOPHILS RELATIVE PERCENT: 0.2 % (ref 0–1)
BILIRUB SERPL-MCNC: 0.4 MG/DL (ref 0–1)
BUN SERPL-MCNC: 14 MG/DL (ref 6–23)
CALCIUM SERPL-MCNC: 9 MG/DL (ref 8.3–10.6)
CHLORIDE BLD-SCNC: 103 MMOL/L (ref 99–110)
CO2: 26 MMOL/L (ref 21–32)
CREAT SERPL-MCNC: 0.7 MG/DL (ref 0.6–1.1)
DIFFERENTIAL TYPE: ABNORMAL
DOSE AMOUNT: ABNORMAL
DOSE AMOUNT: ABNORMAL
DOSE TIME: ABNORMAL
DOSE TIME: ABNORMAL
EOSINOPHILS ABSOLUTE: 0.1 K/CU MM
EOSINOPHILS RELATIVE PERCENT: 1.1 % (ref 0–3)
GFR SERPL CREATININE-BSD FRML MDRD: >60 ML/MIN/1.73M2
GLUCOSE SERPL-MCNC: 96 MG/DL (ref 70–99)
HCT VFR BLD CALC: 38.7 % (ref 37–47)
HEMOGLOBIN: 12 GM/DL (ref 12.5–16)
IMMATURE NEUTROPHIL %: 0.3 % (ref 0–0.43)
LYMPHOCYTES ABSOLUTE: 1.7 K/CU MM
LYMPHOCYTES RELATIVE PERCENT: 15.8 % (ref 24–44)
MCH RBC QN AUTO: 26.7 PG (ref 27–31)
MCHC RBC AUTO-ENTMCNC: 31 % (ref 32–36)
MCV RBC AUTO: 86 FL (ref 78–100)
MONOCYTES ABSOLUTE: 0.6 K/CU MM
MONOCYTES RELATIVE PERCENT: 5.4 % (ref 0–4)
NUCLEATED RBC %: 0 %
PDW BLD-RTO: 13 % (ref 11.7–14.9)
PLATELET # BLD: 298 K/CU MM (ref 140–440)
PMV BLD AUTO: 9.7 FL (ref 7.5–11.1)
POTASSIUM SERPL-SCNC: 4.2 MMOL/L (ref 3.5–5.1)
RBC # BLD: 4.5 M/CU MM (ref 4.2–5.4)
SALICYLATE LEVEL: <0.3 MG/DL (ref 15–30)
SEGMENTED NEUTROPHILS ABSOLUTE COUNT: 8.4 K/CU MM
SEGMENTED NEUTROPHILS RELATIVE PERCENT: 77.2 % (ref 36–66)
SODIUM BLD-SCNC: 138 MMOL/L (ref 135–145)
TOTAL IMMATURE NEUTOROPHIL: 0.03 K/CU MM
TOTAL NUCLEATED RBC: 0 K/CU MM
TOTAL PROTEIN: 7.1 GM/DL (ref 6.4–8.2)
TOTAL RETICULOCYTE COUNT: 0.08 K/CU MM
WBC # BLD: 10.9 K/CU MM (ref 4–10.5)

## 2023-05-06 PROCEDURE — 81025 URINE PREGNANCY TEST: CPT

## 2023-05-06 PROCEDURE — 80053 COMPREHEN METABOLIC PANEL: CPT

## 2023-05-06 PROCEDURE — 85025 COMPLETE CBC W/AUTO DIFF WBC: CPT

## 2023-05-06 PROCEDURE — G0480 DRUG TEST DEF 1-7 CLASSES: HCPCS

## 2023-05-06 PROCEDURE — 80307 DRUG TEST PRSMV CHEM ANLYZR: CPT

## 2023-05-06 PROCEDURE — 99285 EMERGENCY DEPT VISIT HI MDM: CPT

## 2023-05-06 RX ORDER — ARIPIPRAZOLE 10 MG/1
TABLET ORAL
COMMUNITY
Start: 2023-04-14

## 2023-05-06 RX ORDER — MIRTAZAPINE 15 MG/1
TABLET, FILM COATED ORAL
COMMUNITY
Start: 2023-04-25

## 2023-05-06 ASSESSMENT — LIFESTYLE VARIABLES
HOW OFTEN DO YOU HAVE A DRINK CONTAINING ALCOHOL: 4 OR MORE TIMES A WEEK
HOW MANY STANDARD DRINKS CONTAINING ALCOHOL DO YOU HAVE ON A TYPICAL DAY: 10 OR MORE

## 2023-05-07 VITALS
DIASTOLIC BLOOD PRESSURE: 56 MMHG | HEIGHT: 64 IN | SYSTOLIC BLOOD PRESSURE: 101 MMHG | RESPIRATION RATE: 16 BRPM | WEIGHT: 140 LBS | TEMPERATURE: 97.6 F | OXYGEN SATURATION: 98 % | BODY MASS INDEX: 23.9 KG/M2 | HEART RATE: 61 BPM

## 2023-05-07 LAB
AMPHETAMINES: NEGATIVE
BARBITURATE SCREEN URINE: NEGATIVE
BENZODIAZEPINE SCREEN, URINE: NEGATIVE
CANNABINOID SCREEN URINE: ABNORMAL
COCAINE METABOLITE: ABNORMAL
FENTANYL URINE: ABNORMAL
INTERPRETATION: NORMAL
OPIATES, URINE: NEGATIVE
OXYCODONE: NEGATIVE
PREGNANCY, URINE: NEGATIVE
SARS-COV-2 RDRP RESP QL NAA+PROBE: NOT DETECTED
SOURCE: NORMAL

## 2023-05-07 PROCEDURE — 87635 SARS-COV-2 COVID-19 AMP PRB: CPT

## 2023-05-09 ENCOUNTER — CLINICAL DOCUMENTATION (OUTPATIENT)
Dept: INTERNAL MEDICINE CLINIC | Age: 23
End: 2023-05-09

## 2023-05-29 ENCOUNTER — HOSPITAL ENCOUNTER (EMERGENCY)
Age: 23
Discharge: HOME | End: 2023-05-29
Payer: COMMERCIAL

## 2023-05-29 ENCOUNTER — APPOINTMENT (OUTPATIENT)
Dept: GENERAL RADIOLOGY | Age: 23
End: 2023-05-29
Payer: COMMERCIAL

## 2023-05-29 VITALS
DIASTOLIC BLOOD PRESSURE: 64 MMHG | HEART RATE: 75 BPM | HEIGHT: 64 IN | SYSTOLIC BLOOD PRESSURE: 114 MMHG | BODY MASS INDEX: 23.05 KG/M2 | OXYGEN SATURATION: 100 % | TEMPERATURE: 98.1 F | RESPIRATION RATE: 18 BRPM | WEIGHT: 135 LBS

## 2023-05-29 DIAGNOSIS — F19.10 POLYSUBSTANCE ABUSE (HCC): ICD-10-CM

## 2023-05-29 DIAGNOSIS — R55 SYNCOPE AND COLLAPSE: Primary | ICD-10-CM

## 2023-05-29 DIAGNOSIS — R11.2 NAUSEA AND VOMITING, UNSPECIFIED VOMITING TYPE: ICD-10-CM

## 2023-05-29 LAB
ACETAMINOPHEN LEVEL: <5 UG/ML (ref 15–30)
ALBUMIN SERPL-MCNC: 4.3 GM/DL (ref 3.4–5)
ALCOHOL SCREEN SERUM: <0.01 %WT/VOL
ALP BLD-CCNC: 64 IU/L (ref 40–129)
ALT SERPL-CCNC: 11 U/L (ref 10–40)
AMPHETAMINES: NEGATIVE
ANION GAP SERPL CALCULATED.3IONS-SCNC: 9 MMOL/L (ref 4–16)
AST SERPL-CCNC: 17 IU/L (ref 15–37)
BARBITURATE SCREEN URINE: NEGATIVE
BASOPHILS ABSOLUTE: 0 K/CU MM
BASOPHILS RELATIVE PERCENT: 0.4 % (ref 0–1)
BENZODIAZEPINE SCREEN, URINE: NEGATIVE
BILIRUB SERPL-MCNC: 0.4 MG/DL (ref 0–1)
BILIRUBIN URINE: NEGATIVE MG/DL
BLOOD, URINE: NEGATIVE
BUN SERPL-MCNC: 17 MG/DL (ref 6–23)
CALCIUM SERPL-MCNC: 8.9 MG/DL (ref 8.3–10.6)
CANNABINOID SCREEN URINE: ABNORMAL
CHLORIDE BLD-SCNC: 104 MMOL/L (ref 99–110)
CHP ED QC CHECK: YES
CHP ED QC CHECK: YES
CLARITY: CLEAR
CO2: 27 MMOL/L (ref 21–32)
COCAINE METABOLITE: NEGATIVE
COLOR: YELLOW
COMMENT UA: ABNORMAL
CREAT SERPL-MCNC: 0.7 MG/DL (ref 0.6–1.1)
DIFFERENTIAL TYPE: ABNORMAL
DOSE AMOUNT: ABNORMAL
DOSE AMOUNT: ABNORMAL
DOSE TIME: ABNORMAL
DOSE TIME: ABNORMAL
EOSINOPHILS ABSOLUTE: 0.1 K/CU MM
EOSINOPHILS RELATIVE PERCENT: 0.7 % (ref 0–3)
FENTANYL URINE: ABNORMAL
GFR SERPL CREATININE-BSD FRML MDRD: >60 ML/MIN/1.73M2
GLUCOSE BLD-MCNC: 89 MG/DL
GLUCOSE BLD-MCNC: 89 MG/DL (ref 70–99)
GLUCOSE SERPL-MCNC: 81 MG/DL (ref 70–99)
GLUCOSE, URINE: NEGATIVE MG/DL
HCT VFR BLD CALC: 39.8 % (ref 37–47)
HEMOGLOBIN: 12.1 GM/DL (ref 12.5–16)
IMMATURE NEUTROPHIL %: 0.3 % (ref 0–0.43)
INFLUENZA A ANTIGEN: NOT DETECTED
INFLUENZA B ANTIGEN: NOT DETECTED
KETONES, URINE: ABNORMAL MG/DL
LEUKOCYTE ESTERASE, URINE: NEGATIVE
LIPASE: 19 IU/L (ref 13–60)
LYMPHOCYTES ABSOLUTE: 1.9 K/CU MM
LYMPHOCYTES RELATIVE PERCENT: 26.8 % (ref 24–44)
MCH RBC QN AUTO: 26.5 PG (ref 27–31)
MCHC RBC AUTO-ENTMCNC: 30.4 % (ref 32–36)
MCV RBC AUTO: 87.3 FL (ref 78–100)
MONOCYTES ABSOLUTE: 0.5 K/CU MM
MONOCYTES RELATIVE PERCENT: 6.9 % (ref 0–4)
NITRITE URINE, QUANTITATIVE: NEGATIVE
NUCLEATED RBC %: 0 %
OPIATES, URINE: NEGATIVE
OXYCODONE: NEGATIVE
PDW BLD-RTO: 12.4 % (ref 11.7–14.9)
PH, URINE: 7 (ref 5–8)
PLATELET # BLD: 261 K/CU MM (ref 140–440)
PMV BLD AUTO: 10.3 FL (ref 7.5–11.1)
POTASSIUM SERPL-SCNC: 4.1 MMOL/L (ref 3.5–5.1)
PREGNANCY TEST URINE, POC: NEGATIVE
PREGNANCY TEST URINE, POC: NEGATIVE
PROTEIN UA: NEGATIVE MG/DL
RBC # BLD: 4.56 M/CU MM (ref 4.2–5.4)
SALICYLATE LEVEL: <0.3 MG/DL (ref 15–30)
SARS-COV-2 RDRP RESP QL NAA+PROBE: NOT DETECTED
SEGMENTED NEUTROPHILS ABSOLUTE COUNT: 4.7 K/CU MM
SEGMENTED NEUTROPHILS RELATIVE PERCENT: 64.9 % (ref 36–66)
SODIUM BLD-SCNC: 140 MMOL/L (ref 135–145)
SOURCE: NORMAL
SPECIFIC GRAVITY UA: 1.01 (ref 1–1.03)
TOTAL IMMATURE NEUTOROPHIL: 0.02 K/CU MM
TOTAL NUCLEATED RBC: 0 K/CU MM
TOTAL PROTEIN: 7.2 GM/DL (ref 6.4–8.2)
UROBILINOGEN, URINE: 1 MG/DL (ref 0.2–1)
WBC # BLD: 7.3 K/CU MM (ref 4–10.5)

## 2023-05-29 PROCEDURE — 96361 HYDRATE IV INFUSION ADD-ON: CPT

## 2023-05-29 PROCEDURE — 2580000003 HC RX 258: Performed by: NURSE PRACTITIONER

## 2023-05-29 PROCEDURE — 85025 COMPLETE CBC W/AUTO DIFF WBC: CPT

## 2023-05-29 PROCEDURE — 81025 URINE PREGNANCY TEST: CPT

## 2023-05-29 PROCEDURE — 96374 THER/PROPH/DIAG INJ IV PUSH: CPT

## 2023-05-29 PROCEDURE — 99285 EMERGENCY DEPT VISIT HI MDM: CPT

## 2023-05-29 PROCEDURE — 87502 INFLUENZA DNA AMP PROBE: CPT

## 2023-05-29 PROCEDURE — 87635 SARS-COV-2 COVID-19 AMP PRB: CPT

## 2023-05-29 PROCEDURE — 81003 URINALYSIS AUTO W/O SCOPE: CPT

## 2023-05-29 PROCEDURE — G0480 DRUG TEST DEF 1-7 CLASSES: HCPCS

## 2023-05-29 PROCEDURE — 6360000002 HC RX W HCPCS: Performed by: NURSE PRACTITIONER

## 2023-05-29 PROCEDURE — 93005 ELECTROCARDIOGRAM TRACING: CPT | Performed by: PHYSICIAN ASSISTANT

## 2023-05-29 PROCEDURE — 83690 ASSAY OF LIPASE: CPT

## 2023-05-29 PROCEDURE — 82962 GLUCOSE BLOOD TEST: CPT

## 2023-05-29 PROCEDURE — 80053 COMPREHEN METABOLIC PANEL: CPT

## 2023-05-29 PROCEDURE — 71045 X-RAY EXAM CHEST 1 VIEW: CPT

## 2023-05-29 PROCEDURE — 80307 DRUG TEST PRSMV CHEM ANLYZR: CPT

## 2023-05-29 RX ORDER — 0.9 % SODIUM CHLORIDE 0.9 %
1000 INTRAVENOUS SOLUTION INTRAVENOUS ONCE
Status: COMPLETED | OUTPATIENT
Start: 2023-05-29 | End: 2023-05-29

## 2023-05-29 RX ORDER — ONDANSETRON 2 MG/ML
4 INJECTION INTRAMUSCULAR; INTRAVENOUS ONCE
Status: COMPLETED | OUTPATIENT
Start: 2023-05-29 | End: 2023-05-29

## 2023-05-29 RX ADMIN — SODIUM CHLORIDE 1000 ML: 9 INJECTION, SOLUTION INTRAVENOUS at 17:41

## 2023-05-29 RX ADMIN — ONDANSETRON 4 MG: 2 INJECTION INTRAMUSCULAR; INTRAVENOUS at 17:43

## 2023-05-29 NOTE — ED PROVIDER NOTES
12 lead EKG per my interpretation:  Sinus bradycardia 54 with sinus arrhythmia  Axis is   Normal  QTc is   388  There is specific T wave changes appreciated. Inverted t wave V2  There is no specific ST wave changes appreciated.     Prior EKG to compare with was not available         Allyssa Meadows DO  05/29/23 5917

## 2023-05-29 NOTE — ED PROVIDER NOTES
EMERGENCY DEPARTMENT ENCOUNTER      PCP: No primary care provider on file. CHIEF COMPLAINT    Chief Complaint   Patient presents with    Emesis    Loss of Consciousness         HPI    Marciano Huston is a 23 y.o. female with history of herpes and depression with suicidal ideations who presents with complaints of a syncopal episode prior to arrival nausea and vomiting. The patient states she also had an episode of emesis yesterday. She states she takes Abilify and Remeron and they recently changed her doses and she thinks she may have taken the wrong amount. State she is feeling extremely tired. She denies any other drug or alcohol use at this time. She denies headache, fever, cough, body aches, abdominal pain, chest pain, trauma, ingestion, or other complaints. She denies any thoughts of harming herself or others today. REVIEW OF SYSTEMS    Constitutional:  Denies fever, chills, weight loss or weakness   HENT:  Denies sore throat or ear pain   Cardiovascular:  Denies chest pain, palpitation. See HPI  Respiratory:  Denies cough or shortness of breath    GI:  See HPI  :  Denies any urinary symptoms or vaginal symptoms. Musculoskeletal:  Denies back pain  Skin:  Denies rash  Neurologic:  Syncopal episode.  See HPI  Endocrine:  Denies polyuria or polydypsia   Lymphatic:  Denies swollen glands     All other review of systems are negative  See HPI and nursing notes for additional information     PAST MEDICAL AND SURGICAL HISTORY    Past Medical History:   Diagnosis Date    Depression     Herpes simplex virus (HSV) infection     Hypertension      Past Surgical History:   Procedure Laterality Date     SECTION N/A 2022     SECTION performed by Brigido Almaraz MD at [facility] L[facility]D OR       CURRENT MEDICATIONS    Current Outpatient Rx   Medication Sig Dispense Refill    ARIPiprazole (ABILIFY) 10 MG tablet TAKE 1 TABLET BY MOUTH EVERYDAY AT BEDTIME      mirtazapine (REMERON) 15 MG

## 2023-05-30 LAB
EKG ATRIAL RATE: 54 BPM
EKG DIAGNOSIS: NORMAL
EKG P AXIS: 40 DEGREES
EKG P-R INTERVAL: 140 MS
EKG Q-T INTERVAL: 410 MS
EKG QRS DURATION: 90 MS
EKG QTC CALCULATION (BAZETT): 388 MS
EKG R AXIS: 88 DEGREES
EKG T AXIS: 68 DEGREES
EKG VENTRICULAR RATE: 54 BPM

## 2023-05-30 PROCEDURE — 93010 ELECTROCARDIOGRAM REPORT: CPT | Performed by: INTERNAL MEDICINE

## 2023-05-30 NOTE — DISCHARGE INSTRUCTIONS
Follow with her primary care provider this week. Call tomorrow to schedule an appointment. Take medications as prescribed. Maintain hydration. Return to the emergency department for worsening symptoms.

## 2023-05-30 NOTE — ED NOTES
Pt discharged from ED at this time with all necessary paperwork. All questions answered at this time and discharge instructions reviewed. Pt ambulates to waiting room.        Jaden Reyes RN  05/29/23 0684

## 2024-03-06 ENCOUNTER — HOSPITAL ENCOUNTER (EMERGENCY)
Age: 24
Discharge: HOME | End: 2024-03-07
Payer: MEDICAID

## 2024-03-06 DIAGNOSIS — R11.0 NAUSEA: Primary | ICD-10-CM

## 2024-03-06 PROCEDURE — 99283 EMERGENCY DEPT VISIT LOW MDM: CPT

## 2024-03-06 PROCEDURE — 6370000000 HC RX 637 (ALT 250 FOR IP): Performed by: PHYSICIAN ASSISTANT

## 2024-03-06 RX ORDER — ONDANSETRON 4 MG/1
4 TABLET, ORALLY DISINTEGRATING ORAL ONCE
Status: COMPLETED | OUTPATIENT
Start: 2024-03-06 | End: 2024-03-06

## 2024-03-06 RX ADMIN — ONDANSETRON 4 MG: 4 TABLET, ORALLY DISINTEGRATING ORAL at 23:09

## 2024-03-06 ASSESSMENT — LIFESTYLE VARIABLES
HOW OFTEN DO YOU HAVE A DRINK CONTAINING ALCOHOL: NEVER
HOW MANY STANDARD DRINKS CONTAINING ALCOHOL DO YOU HAVE ON A TYPICAL DAY: PATIENT DOES NOT DRINK

## 2024-03-07 VITALS
RESPIRATION RATE: 16 BRPM | SYSTOLIC BLOOD PRESSURE: 120 MMHG | OXYGEN SATURATION: 99 % | TEMPERATURE: 98.3 F | HEART RATE: 66 BPM | DIASTOLIC BLOOD PRESSURE: 53 MMHG

## 2024-03-07 RX ORDER — ONDANSETRON 4 MG/1
4 TABLET, ORALLY DISINTEGRATING ORAL 3 TIMES DAILY PRN
Qty: 21 TABLET | Refills: 0 | Status: SHIPPED | OUTPATIENT
Start: 2024-03-07

## 2024-03-07 NOTE — ED PROVIDER NOTES
EMERGENCY DEPARTMENT ENCOUNTER        Pt Name: Jazmin Funez  MRN: [id number]  Birthdate 2000  Date of evaluation: 3/6/2024  Provider: Esther Paula PA-C  PCP: Davis Rogers MD    RAGINI. I have evaluated this patient.        Triage CHIEF COMPLAINT       Chief Complaint   Patient presents with    Emesis During Pregnancy     15 weeks pregnant          HISTORY OF PRESENT ILLNESS      Chief Complaint: Nausea    Jazmin Funez is a 23 y.o. female who presents for nausea.  Patient is 15 weeks pregnant.  She states she has not yet seen an OBGYN because she had to wait for her insurance to be re-instated but she has had prenatal care at the [facility].  She states she has had nausea/vomiting throughout her pregnancy.  Today she has not had an appetite at all.  She states she took a 5 hour nap and when she woke up she was nauseated, so she decided to come here because she doesn't have any medication for her nausea.  She denies abdominal pain.  Denies vaginal bleeding.  She denies fever, chills, nasal congestion, sore throat, cough.        Nursing Notes were all reviewed and agreed with or any disagreements were addressed in the HPI.    REVIEW OF SYSTEMS     CONSTITUTIONAL:  Denies fever.  EYES:  Denies visual changes.  HEAD:  Denies headache.  ENT:  Denies earache, nasal congestion, sore throat.  NECK:  Denies neck pain.  RESPIRATORY:  Denies any shortness of breath.  CARDIOVASCULAR:  Denies chest pain.  GI:  + nausea.  :  Denies urinary symptoms.  MUSCULOSKELETAL:  Denies extremity pain or swelling.  BACK:  Denies back pain.  INTEGUMENT:  Denies skin changes.  LYMPHATIC:  Denies lymphadenopathy.  NEUROLOGIC:  Denies any numbness/tingling.  PSYCHIATRIC:  Denies SI/HI.    PAST MEDICAL HISTORY     Past Medical History:   Diagnosis Date    Depression     Herpes simplex virus (HSV) infection     Hypertension        SURGICAL HISTORY     Past Surgical History:   Procedure Laterality